# Patient Record
Sex: FEMALE | Race: OTHER | Employment: FULL TIME | URBAN - METROPOLITAN AREA
[De-identification: names, ages, dates, MRNs, and addresses within clinical notes are randomized per-mention and may not be internally consistent; named-entity substitution may affect disease eponyms.]

---

## 2017-03-18 ENCOUNTER — APPOINTMENT (OUTPATIENT)
Dept: GENERAL RADIOLOGY | Age: 52
End: 2017-03-18
Attending: NURSE PRACTITIONER
Payer: COMMERCIAL

## 2017-03-18 ENCOUNTER — HOSPITAL ENCOUNTER (OUTPATIENT)
Age: 52
Discharge: HOME | End: 2017-03-18
Payer: COMMERCIAL

## 2017-03-18 VITALS
HEART RATE: 67 BPM | SYSTOLIC BLOOD PRESSURE: 115 MMHG | DIASTOLIC BLOOD PRESSURE: 62 MMHG | OXYGEN SATURATION: 100 % | BODY MASS INDEX: 21.79 KG/M2 | WEIGHT: 123 LBS | HEIGHT: 63 IN | RESPIRATION RATE: 16 BRPM | TEMPERATURE: 98 F

## 2017-03-18 DIAGNOSIS — J40 BRONCHITIS: ICD-10-CM

## 2017-03-18 DIAGNOSIS — H65.91 RIGHT NON-SUPPURATIVE OTITIS MEDIA: Primary | ICD-10-CM

## 2017-03-18 PROCEDURE — 93010 ELECTROCARDIOGRAM REPORT: CPT | Performed by: NURSE PRACTITIONER

## 2017-03-18 PROCEDURE — 99214 OFFICE O/P EST MOD 30 MIN: CPT

## 2017-03-18 PROCEDURE — 71020 XR CHEST PA + LAT CHEST (CPT=71020): CPT

## 2017-03-18 PROCEDURE — 99204 OFFICE O/P NEW MOD 45 MIN: CPT

## 2017-03-18 PROCEDURE — 93010 ELECTROCARDIOGRAM REPORT: CPT

## 2017-03-18 PROCEDURE — 93005 ELECTROCARDIOGRAM TRACING: CPT

## 2017-03-18 RX ORDER — ALBUTEROL SULFATE 90 UG/1
2 AEROSOL, METERED RESPIRATORY (INHALATION) EVERY 4 HOURS PRN
Qty: 1 INHALER | Refills: 0 | Status: SHIPPED | OUTPATIENT
Start: 2017-03-18 | End: 2017-04-17

## 2017-03-18 RX ORDER — BENZONATATE 100 MG/1
200 CAPSULE ORAL 3 TIMES DAILY PRN
Qty: 30 CAPSULE | Refills: 0 | Status: SHIPPED | OUTPATIENT
Start: 2017-03-18 | End: 2017-04-17

## 2017-03-18 RX ORDER — AZITHROMYCIN 250 MG/1
TABLET, FILM COATED ORAL
Qty: 1 PACKAGE | Refills: 0 | Status: SHIPPED | OUTPATIENT
Start: 2017-03-18 | End: 2017-03-23

## 2017-03-18 NOTE — ED INITIAL ASSESSMENT (HPI)
REPORTS COUGH THAT WORSENS AT NIGHT FOR OVER 1 WEEK. ALSO C/O BILATERAL EAR PAIN AND THROAT PAIN. TAKING MUCINEX AND IBUPROFEN AT HOME. STATES SORE THROAT HAS RESOLVED. STATES SHE HAS FELT \"LIGHTDEADED AND DIZZY. \"

## 2017-03-18 NOTE — ED PROVIDER NOTES
Patient presents with:  Cough/URI  Ear Problem Pain (neurosensory)      HPI:     Lauren Rodriguez is a 45year old female who presents for evaluation and management of a chief complaint of cough without respiratory distress for the past 10 days.   She ha above.  Constitutional and Vital Signs Reviewed.     Physical Exam:     Findings:    /62 mmHg  Pulse 67  Temp(Src) 98.3 °F (36.8 °C) (Temporal)  Resp 16  Ht 160 cm (5' 3\")  Wt 55.792 kg  BMI 21.79 kg/m2  SpO2 100%  GENERAL: well developed, well mary ST elevation. No ectopy. No previous EKG to compare. Xr Chest Pa + Lat Chest (cpt=71020)    3/18/2017  CONCLUSION:  1. Normal examination. No significant change has occurred from November 28, 2005. Patient is aware of the testing results.   Pedro

## 2017-06-28 ENCOUNTER — TELEPHONE (OUTPATIENT)
Dept: INTERNAL MEDICINE CLINIC | Facility: CLINIC | Age: 52
End: 2017-06-28

## 2017-06-28 NOTE — TELEPHONE ENCOUNTER
Per pt, she can not see Dr Sejal Harris soon in a couple of wk, pt would like to know if AMBIKA can give or suggest something pt can do about her neck pain pls send to her pharmacy on file, pt stts that it is triggering her migraine and OTC migraine med is not working

## 2017-06-28 NOTE — TELEPHONE ENCOUNTER
Please advise. Thank you. Pt states that Dr. Carol Constantino and another MD in the office do the EMG testing either on Thursdays or Fridays so she will need to check with her work schedule to make an appt.     Pt states that she has been having her migraine headache

## 2017-06-28 NOTE — TELEPHONE ENCOUNTER
Patient was supposed to have an EMG with the [facility]sciences [place]–Dr. Kiera Jesus. Would suggest sitting up an appointment with Dr. VINES–[phone] for an appointment in addition to doing the EMG with her.   She did have an MRI showing arthritis in the neck whic

## 2017-06-28 NOTE — TELEPHONE ENCOUNTER
Actions Requested:    Patient requesting an appointment   I suggested the ED which will not go to. 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 Patient also is recommending someone else to see besides Dr. Carmen De Jesus.    Would like weakness of the face, arm, or leg on one side of the body, numbness of the face, arm, or leg on one side of the body, loss of speech or garbled speech    Negative Triage Questions:   * Difficult to awaken or acting confused (e.g., disoriented, slurred speec

## 2017-06-29 NOTE — TELEPHONE ENCOUNTER
Okay to give her a letter for missed work and for a few days off if she needs it. Prednisone 5 mg p.o. twice daily for 5 days and then daily for 5 days. tizanidine–2 mg p.o. nightly. –30 days. Meloxicam 15 mg p.o. daily for 30 days.

## 2017-07-01 RX ORDER — MELOXICAM 15 MG/1
15 TABLET ORAL DAILY
Qty: 30 TABLET | Refills: 0 | Status: SHIPPED | OUTPATIENT
Start: 2017-07-01 | End: 2018-02-17

## 2017-07-01 RX ORDER — PREDNISONE 1 MG/1
TABLET ORAL
Qty: 15 TABLET | Refills: 0 | Status: SHIPPED | OUTPATIENT
Start: 2017-07-01 | End: 2018-02-17

## 2017-07-01 RX ORDER — TIZANIDINE HYDROCHLORIDE 2 MG/1
2 CAPSULE, GELATIN COATED ORAL NIGHTLY
Qty: 30 CAPSULE | Refills: 0 | Status: SHIPPED | OUTPATIENT
Start: 2017-07-01 | End: 2018-02-17

## 2017-07-01 NOTE — TELEPHONE ENCOUNTER
Pt returned call, reviewed Dr Karthik Long orders below 6/29/17 with patient who verbalized understanding and agreed with plan.  Message sent to [facility] with letter as per pt request.

## 2018-02-17 ENCOUNTER — OFFICE VISIT (OUTPATIENT)
Dept: INTERNAL MEDICINE CLINIC | Facility: CLINIC | Age: 53
End: 2018-02-17

## 2018-02-17 VITALS
TEMPERATURE: 98 F | WEIGHT: 122 LBS | HEIGHT: 62.5 IN | DIASTOLIC BLOOD PRESSURE: 63 MMHG | HEART RATE: 49 BPM | SYSTOLIC BLOOD PRESSURE: 97 MMHG | BODY MASS INDEX: 21.89 KG/M2

## 2018-02-17 DIAGNOSIS — Z12.31 VISIT FOR SCREENING MAMMOGRAM: ICD-10-CM

## 2018-02-17 DIAGNOSIS — M19.90 ARTHRITIS: Primary | ICD-10-CM

## 2018-02-17 DIAGNOSIS — M81.0 OSTEOPOROSIS, UNSPECIFIED OSTEOPOROSIS TYPE, UNSPECIFIED PATHOLOGICAL FRACTURE PRESENCE: ICD-10-CM

## 2018-02-17 DIAGNOSIS — L20.82 FLEXURAL ECZEMA: ICD-10-CM

## 2018-02-17 DIAGNOSIS — Z00.00 ROUTINE HEALTH MAINTENANCE: ICD-10-CM

## 2018-02-17 PROBLEM — M25.50 ARTHRALGIA: Status: ACTIVE | Noted: 2018-02-17

## 2018-02-17 PROCEDURE — 99212 OFFICE O/P EST SF 10 MIN: CPT | Performed by: INTERNAL MEDICINE

## 2018-02-17 PROCEDURE — 99214 OFFICE O/P EST MOD 30 MIN: CPT | Performed by: INTERNAL MEDICINE

## 2018-02-17 NOTE — PROGRESS NOTES
HPI:    Patient ID: Jose Angel Brennan is a 47year old female. Pt c/o joint pains and itchy patches. She saw Dr. PATEL--no inflammatory arthritis. She has been getting injections. She has a left frozen shoulder which is a little better.   She still has EXCEDRIN MIGRAINE OR as needed Disp:  Rfl:    ADVIL 200 MG OR TABS 1 TABLET EVERY 4 TO 6 HOURS AS NEEDED Disp:  Rfl:        Allergies:  Adhesive Tape           Itching    Comment:blisters  Erythromycin            Palpitations    Comment:Blurred vision  Pcn Counseled regarding need for Calcium with D, 1200 mg in divided doses. Counseled regarding weight-bearing exercise. Counseled regarded consequences of worsening of bone density.            Other Visit Diagnoses     Visit for screening mammogram        Rel

## 2018-02-17 NOTE — ASSESSMENT & PLAN NOTE
Reviewed Dexa scan with pt from 10/2016. Counseled regarding need for Calcium with D, 1200 mg in divided doses. Counseled regarding weight-bearing exercise. Counseled regarded consequences of worsening of bone density.

## 2018-02-17 NOTE — PATIENT INSTRUCTIONS
I recommend that you take calcium carbonate with vitamin D, 600 mg, 1 tab twice a day with food. If the calcium supplements are too large to swallow, chewable calcium supplements are available.  You should also do weight bearing exercise such as walking

## 2018-04-10 ENCOUNTER — OFFICE VISIT (OUTPATIENT)
Dept: INTERNAL MEDICINE CLINIC | Facility: CLINIC | Age: 53
End: 2018-04-10

## 2018-04-10 VITALS
DIASTOLIC BLOOD PRESSURE: 67 MMHG | BODY MASS INDEX: 21.89 KG/M2 | HEART RATE: 59 BPM | HEIGHT: 62.5 IN | WEIGHT: 122 LBS | TEMPERATURE: 98 F | SYSTOLIC BLOOD PRESSURE: 103 MMHG

## 2018-04-10 DIAGNOSIS — M54.12 CERVICAL RADICULOPATHY: Primary | ICD-10-CM

## 2018-04-10 DIAGNOSIS — E55.9 VITAMIN D DEFICIENCY: ICD-10-CM

## 2018-04-10 DIAGNOSIS — M54.41 CHRONIC RIGHT-SIDED LOW BACK PAIN WITH RIGHT-SIDED SCIATICA: ICD-10-CM

## 2018-04-10 DIAGNOSIS — M81.0 AGE-RELATED OSTEOPOROSIS WITHOUT CURRENT PATHOLOGICAL FRACTURE: ICD-10-CM

## 2018-04-10 DIAGNOSIS — Z00.00 ROUTINE PHYSICAL EXAMINATION: ICD-10-CM

## 2018-04-10 DIAGNOSIS — G89.29 CHRONIC RIGHT-SIDED LOW BACK PAIN WITH RIGHT-SIDED SCIATICA: ICD-10-CM

## 2018-04-10 PROBLEM — M54.50 LOW BACK PAIN: Status: ACTIVE | Noted: 2018-04-10

## 2018-04-10 PROCEDURE — 99212 OFFICE O/P EST SF 10 MIN: CPT | Performed by: INTERNAL MEDICINE

## 2018-04-10 PROCEDURE — 99214 OFFICE O/P EST MOD 30 MIN: CPT | Performed by: INTERNAL MEDICINE

## 2018-04-10 RX ORDER — MELOXICAM 15 MG/1
15 TABLET ORAL DAILY
Qty: 30 TABLET | Refills: 2 | Status: SHIPPED | OUTPATIENT
Start: 2018-04-10 | End: 2019-02-13

## 2018-04-10 RX ORDER — MELATONIN 10 MG
CAPSULE ORAL
COMMUNITY
End: 2019-04-13

## 2018-04-10 NOTE — PROGRESS NOTES
HPI:    Patient ID: Dary Alex is a 47year old female. HPI    Review of Systems         Current Outpatient Prescriptions:  Melatonin 10 MG Oral Cap Take by mouth.  Disp:  Rfl:    triamcinolone acetonide 0.1 % External Cream Apply topically 2 (t

## 2018-04-10 NOTE — PATIENT INSTRUCTIONS
Problem List Items Addressed This Visit        Unprioritized    Cervical radiculopathy - Primary     History of chronic cervical radiculopathy but symptoms have improved significantly.   She does sleep on her arm which is probably aggravating her neck stiff

## 2018-04-10 NOTE — PROGRESS NOTES
HPI:    Patient ID: Jose Caba is a 47year old female. Osteoarthritis   This is a chronic problem. The current episode started more than 1 year ago. The problem occurs constantly.  The problem has been waxing and waning (Pain, stiffness, shou Comment:Blurred vision  Pcn [Penicillins]       Hives  Pine Oil  [Pine Tar]    Unknown      04/10/18  1707   BP: 103/67   Pulse: 59   Temp: 98.1 °F (36.7 °C)     Body mass index is 21.96 kg/m².     PHYSICAL EXAM:   Physical Exam   Constitutional: She is yovana pillow for increased support of the neck and shoulder. Call if any other problems. Will not start on a new medication for management of pain here.          Low back pain     Chronic low back pain, radiating into the right groin, right lower extremity manuel Meloxicam 15 MG Oral Tab 30 tablet 2      Sig: Take 1 tablet (15 mg total) by mouth daily.            Imaging & Referrals:  XR HIP + PELVIS MIN 4 VIEWS RIGHT (CPT=73503)  XR LUMBAR SPINE (MIN 4 VIEWS) (CPT=72110)       JQ#[id number]

## 2018-04-10 NOTE — ASSESSMENT & PLAN NOTE
History of chronic cervical radiculopathy but symptoms have improved significantly. She does sleep on her arm which is probably aggravating her neck stiffness and pain. Advised to use a neck roll pillow for increased support of the neck and shoulder.   Ca

## 2018-04-10 NOTE — ASSESSMENT & PLAN NOTE
Chronic low back pain, radiating into the right groin, right lower extremity almost to the right ankle. No specific injuries at this time but patient has had multiple injuries when younger and has been under chiropractic care.   X-rays of the lumbar spine,

## 2019-01-09 ENCOUNTER — WALK IN (OUTPATIENT)
Dept: URGENT CARE | Age: 54
End: 2019-01-09

## 2019-01-09 VITALS
BODY MASS INDEX: 20.73 KG/M2 | DIASTOLIC BLOOD PRESSURE: 74 MMHG | HEART RATE: 68 BPM | TEMPERATURE: 98 F | RESPIRATION RATE: 14 BRPM | HEIGHT: 63 IN | WEIGHT: 117 LBS | SYSTOLIC BLOOD PRESSURE: 118 MMHG

## 2019-01-09 DIAGNOSIS — B37.2 CANDIDAL INTERTRIGO: Primary | ICD-10-CM

## 2019-01-09 PROCEDURE — 99203 OFFICE O/P NEW LOW 30 MIN: CPT | Performed by: NURSE PRACTITIONER

## 2019-01-09 RX ORDER — KETOCONAZOLE 20 MG/G
CREAM TOPICAL DAILY
Qty: 15 G | Refills: 0
Start: 2019-01-09 | End: 2019-01-16

## 2019-01-09 ASSESSMENT — ENCOUNTER SYMPTOMS
ANOREXIA: 0
NAIL CHANGES: 0
EYES NEGATIVE: 1
FATIGUE: 0
FEVER: 0
RHINORRHEA: 0
WOUND: 0
EYE PAIN: 0
SHORTNESS OF BREATH: 0
VOMITING: 0
SORE THROAT: 0
GASTROINTESTINAL NEGATIVE: 1
COUGH: 0
RESPIRATORY NEGATIVE: 1
DIARRHEA: 0
CONSTITUTIONAL NEGATIVE: 1

## 2019-02-13 ENCOUNTER — TELEPHONE (OUTPATIENT)
Dept: INTERNAL MEDICINE CLINIC | Facility: CLINIC | Age: 54
End: 2019-02-13

## 2019-02-13 RX ORDER — MELOXICAM 15 MG/1
15 TABLET ORAL DAILY
Qty: 30 TABLET | Refills: 2 | Status: SHIPPED | OUTPATIENT
Start: 2019-02-13 | End: 2020-02-11

## 2019-02-13 NOTE — TELEPHONE ENCOUNTER
Pt would like to schedule an appt for a physical and pap with Dr. Isma Pollard. Pt would like to be seen at 6:00 at any day and location. Pt states that she needs this for her employment. There are no available appointments.      Please reply to pool: EM CALL LAKEISHA

## 2019-02-14 NOTE — TELEPHONE ENCOUNTER
Pt returning call back but per pt she can not come in next wk Thursday due to her job. Pt stts that she can come in on 02/28/2019 or 03/01/2019 @ 5:30PM.  Pls advise Thank you!!!     Please reply to pool: ANTONETTE García

## 2019-02-14 NOTE — TELEPHONE ENCOUNTER
Please review; protocol failed.   Refill Protocol Appointment Criteria  · Appointment scheduled in the past 6 months or in the next 3 months  Recent Outpatient Visits            10 months ago Cervical radiculopathy    [facility], [facility], [address] [facility]

## 2019-02-17 ENCOUNTER — APPOINTMENT (OUTPATIENT)
Dept: GENERAL RADIOLOGY | Age: 54
End: 2019-02-17
Attending: NURSE PRACTITIONER
Payer: COMMERCIAL

## 2019-02-17 ENCOUNTER — HOSPITAL ENCOUNTER (OUTPATIENT)
Age: 54
Discharge: HOME | End: 2019-02-17
Payer: COMMERCIAL

## 2019-02-17 VITALS
OXYGEN SATURATION: 97 % | BODY MASS INDEX: 21.9 KG/M2 | SYSTOLIC BLOOD PRESSURE: 105 MMHG | TEMPERATURE: 98 F | RESPIRATION RATE: 18 BRPM | HEIGHT: 62 IN | HEART RATE: 68 BPM | DIASTOLIC BLOOD PRESSURE: 60 MMHG | WEIGHT: 119 LBS

## 2019-02-17 DIAGNOSIS — S62.102A CLOSED FRACTURE OF LEFT WRIST, INITIAL ENCOUNTER: ICD-10-CM
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
## 2019-02-17 PROCEDURE — 73080 X-RAY EXAM OF ELBOW: CPT | Performed by: NURSE PRACTITIONER

## 2019-02-17 PROCEDURE — 99214 OFFICE O/P EST MOD 30 MIN: CPT

## 2019-02-17 PROCEDURE — 73110 X-RAY EXAM OF WRIST: CPT | Performed by: NURSE PRACTITIONER

## 2019-02-17 NOTE — ED INITIAL ASSESSMENT (HPI)
PATIENT ARRIVED AMBULATORY TO ROOM. PATIENT FELL PTA PLAYING IN THE BACK YARD WITH HER DOGS. PATIENT C/O LEFT ELBOW PAIN AND LEFT WRIST PAIN. NO OBVIOUS DEFORMITY. PAIN WORSENS WITH MOVEMENT. PATIENT DENIES HITTING HER HEAD.

## 2019-02-17 NOTE — ED PROVIDER NOTES
Patient presents with:  Fall      HPI:     Lencho Amin is a 46year old female who presents today with a chief complaint of pain in the left wrist and elbow after a fall that occurred PTA.   The patient states she slipped on some ice in her backyard use: Yes        Alcohol/week: 0.0 oz        Comment: 1-2 drinks/week      Drug use: No      Sexual activity: Not on file    Lifestyle      Physical activity:        Days per week: Not on file        Minutes per session: Not on file      Stress: Not on file hand without difficulty.   Point Tenderness: Yes    /60   Pulse 68   Temp 98.3 °F (36.8 °C) (Oral)   Resp 18   Ht 157.5 cm (5' 2\")   Wt 54 kg   SpO2 97%   BMI 21.77 kg/m²   GENERAL: well developed, well nourished, well hydrated, no distress  SKIN: go Soft tissue swelling of the left elbow without radiographic evidence of underlying osseous injury.     Dictated by (CST): Sherman Lyons MD on 2/17/2019 at 12:11     Approved by (CST): Sherman Lyons MD on 2/17/2019 at 12:12          Xr Wrist Complete (mi

## 2019-02-20 ENCOUNTER — OFFICE VISIT (OUTPATIENT)
Dept: ORTHOPEDICS CLINIC | Facility: CLINIC | Age: 54
End: 2019-02-20
Payer: COMMERCIAL

## 2019-02-20 DIAGNOSIS — S52.572A OTHER CLOSED INTRA-ARTICULAR FRACTURE OF DISTAL END OF LEFT RADIUS, INITIAL ENCOUNTER: Primary | ICD-10-CM

## 2019-02-20 PROCEDURE — L3908 WHO COCK-UP NONMOLDE PRE OTS: HCPCS | Performed by: ORTHOPAEDIC SURGERY

## 2019-02-20 PROCEDURE — 99243 OFF/OP CNSLTJ NEW/EST LOW 30: CPT | Performed by: ORTHOPAEDIC SURGERY

## 2019-02-20 PROCEDURE — 99212 OFFICE O/P EST SF 10 MIN: CPT | Performed by: ORTHOPAEDIC SURGERY

## 2019-02-20 NOTE — H&P
NURSING INTAKE COMMENTS: Patient presents with:  Consult: left wrist- pt states she fell on ice while playing with her dogs on 2/17/19. pt went to [facility] and had [facility]R. HPI: This 46year old female presents today with complaints of left wrist pain.   Patient Years of education: Not on file      Highest education level: Not on file    Occupational History      Not on file    Social Needs      Financial resource strain: Not on file      Food insecurity:        Worry: Not on file        Inability: Not on file systems was completed. Pertinent positives and negatives noted in the the HPI.     Physical Examination:  There is no height or weight on file to calculate BMI., Wt Readings from Last 6 Encounters:  02/17/19 : 119 lb (54 kg)  04/10/18 : 122 lb (55.3 kg)  0

## 2019-02-27 ENCOUNTER — OFFICE VISIT (OUTPATIENT)
Dept: ORTHOPEDICS CLINIC | Facility: CLINIC | Age: 54
End: 2019-02-27
Payer: COMMERCIAL

## 2019-02-27 ENCOUNTER — HOSPITAL ENCOUNTER (OUTPATIENT)
Dept: GENERAL RADIOLOGY | Facility: HOSPITAL | Age: 54
Discharge: HOME | End: 2019-02-27
Attending: ORTHOPAEDIC SURGERY
Payer: COMMERCIAL

## 2019-02-27 DIAGNOSIS — S52.572A OTHER CLOSED INTRA-ARTICULAR FRACTURE OF DISTAL END OF LEFT RADIUS, INITIAL ENCOUNTER: ICD-10-CM

## 2019-02-27 DIAGNOSIS — Z47.89 ORTHOPEDIC AFTERCARE: ICD-10-CM

## 2019-02-27 DIAGNOSIS — Z47.89 ORTHOPEDIC AFTERCARE: Primary | ICD-10-CM

## 2019-02-27 PROCEDURE — 99212 OFFICE O/P EST SF 10 MIN: CPT | Performed by: ORTHOPAEDIC SURGERY

## 2019-02-27 PROCEDURE — 73110 X-RAY EXAM OF WRIST: CPT | Performed by: ORTHOPAEDIC SURGERY

## 2019-02-27 PROCEDURE — 99213 OFFICE O/P EST LOW 20 MIN: CPT | Performed by: ORTHOPAEDIC SURGERY

## 2019-02-27 NOTE — PROGRESS NOTES
NURSING INTAKE COMMENTS: Patient presents with:  Fracture: clare wrist -- Rates pain 0/10. States splint can cause some discomfort. Typing causing discomfort down forearm. States she has had some tingling in thum and 2nd finger one day. Right handed. Marital status:       Spouse name: Not on file      Number of children: Not on file      Years of education: Not on file      Highest education level: Not on file    Occupational History      Not on file    Social Needs      Financial resource stra Self-Exams: Not Asked    Social History Narrative      Not on file      A comprehensive 10 point review of systems was completed. Pertinent positives and negatives noted in the the HPI.     Physical Examination:  There is no height or weight on file to leila

## 2019-04-13 ENCOUNTER — OFFICE VISIT (OUTPATIENT)
Dept: INTERNAL MEDICINE CLINIC | Facility: CLINIC | Age: 54
End: 2019-04-13
Payer: COMMERCIAL

## 2019-04-13 VITALS
BODY MASS INDEX: 22.25 KG/M2 | SYSTOLIC BLOOD PRESSURE: 130 MMHG | DIASTOLIC BLOOD PRESSURE: 70 MMHG | WEIGHT: 124 LBS | HEART RATE: 73 BPM | HEIGHT: 62.5 IN

## 2019-04-13 DIAGNOSIS — Z00.00 PHYSICAL EXAM: Primary | ICD-10-CM

## 2019-04-13 DIAGNOSIS — Z12.31 VISIT FOR SCREENING MAMMOGRAM: ICD-10-CM

## 2019-04-13 DIAGNOSIS — M81.0 AGE-RELATED OSTEOPOROSIS WITHOUT CURRENT PATHOLOGICAL FRACTURE: ICD-10-CM

## 2019-04-13 DIAGNOSIS — Z12.11 ENCOUNTER FOR SCREENING COLONOSCOPY: ICD-10-CM

## 2019-04-13 DIAGNOSIS — I05.9 MITRAL VALVE DISORDER: ICD-10-CM

## 2019-04-13 PROCEDURE — 99396 PREV VISIT EST AGE 40-64: CPT | Performed by: INTERNAL MEDICINE

## 2019-04-13 RX ORDER — CLINDAMYCIN HYDROCHLORIDE 300 MG/1
CAPSULE ORAL 3 TIMES DAILY
COMMUNITY
End: 2019-05-02 | Stop reason: ALTCHOICE

## 2019-04-13 NOTE — PROGRESS NOTES
Josh Lagos is a 46year old female.   Patient presents with:  Physical      HPI:   Pt comes for physical  C/c physical   C/o has inf of the gum and needs a release for dental extraction bc she is on meloxicam   Will go for the extraction baldemar week ear pain just ringing in the right ear ,  nasal congestion or sore throat  SKIN: denies any unusual skin lesions , +  Rashes, bellybutton midline buttocks and left arm-- was on steroid then antifungal -does use non-scented for hypoallergenic sensitive skin screening colonoscopy  -     GASTRO - INTERNAL  Will refer to GI  Mitral valve disorder  -     CARD ECHO 2D DOPPLER (CPT=93306);  Future    Age-related osteoporosis without current pathological fracture   Noted osteoporosis in her previous DEXA scan, can co

## 2019-04-13 NOTE — PATIENT INSTRUCTIONS
Prevention Guidelines, Women Ages 48 to 59  Screening tests and vaccines are an important part of managing your health. A screening test is done to find possible disorders or diseases in people who don't have any symptoms.  The goal is to find a disease e Chlamydia Women at increased risk for infection At routine exams   Colorectal cancer All women in this age group Flexible sigmoidoscopy every 5 years, or colonoscopy every 10 years, or double-contrast barium enema every 5 years; yearly fecal occult blood t Hepatitis B Women at increased risk for infection – talk with your healthcare provider 3 doses over 6 months; second dose should be given 1 month after the first dose; the third dose should be given at least 2 months after the second dose and at least 4 mo Use of daily aspirin Women ages 54 and up in this age group who are at risk for cardiovascular health problems such as stroke When your risk is known   Use of tobacco and the health effects it can cause All women in this age group Every exam   1Amerjp Ca

## 2019-05-02 ENCOUNTER — OFFICE VISIT (OUTPATIENT)
Dept: INTERNAL MEDICINE CLINIC | Facility: CLINIC | Age: 54
End: 2019-05-02
Payer: COMMERCIAL

## 2019-05-02 ENCOUNTER — TELEPHONE (OUTPATIENT)
Dept: INTERNAL MEDICINE CLINIC | Facility: CLINIC | Age: 54
End: 2019-05-02

## 2019-05-02 VITALS
HEART RATE: 51 BPM | WEIGHT: 123 LBS | DIASTOLIC BLOOD PRESSURE: 69 MMHG | TEMPERATURE: 98 F | BODY MASS INDEX: 22.07 KG/M2 | HEIGHT: 62.5 IN | SYSTOLIC BLOOD PRESSURE: 106 MMHG

## 2019-05-02 DIAGNOSIS — K20.80 PILL ESOPHAGITIS: Primary | ICD-10-CM

## 2019-05-02 DIAGNOSIS — T50.905A PILL ESOPHAGITIS: Primary | ICD-10-CM

## 2019-05-02 PROCEDURE — 99213 OFFICE O/P EST LOW 20 MIN: CPT | Performed by: INTERNAL MEDICINE

## 2019-05-02 PROCEDURE — 99212 OFFICE O/P EST SF 10 MIN: CPT | Performed by: INTERNAL MEDICINE

## 2019-05-02 NOTE — TELEPHONE ENCOUNTER
Pt called in stating that her preferred lab is through [facility]. She is asking for 4/13 labs to be faxed over there today, because she has appt set up. Please advise and please call back with confirmation.      Lauren Snowden on [facility] [place]  Fax# [phone]

## 2019-05-02 NOTE — PROGRESS NOTES
Patient ID: Harshad Claire is a 46year old female. Patient presents with: Other: pt concerned might have burn esophagus. Took 3 advils without water on Saturday morning, and now has pressure on chest painful to swallow.  Burning sensation when layi PALPITATIONS    Comment:Blurred vision  Pcn [Penicillins]       HIVES  Pine Oil  [Pine Tar]    UNKNOWN    Social History    Socioeconomic History      Marital status:       Spouse name: Not on file      Number of children: Not on file      Years Special Diet: Not Asked        Back Care: Not Asked        Exercise: Not Asked        Bike Helmet: Not Asked        Seat Belt: Not Asked        Self-Exams: Not Asked    Social History Narrative      Not on file          PHYSICAL EXAM:      05/02/19  1007

## 2019-05-02 NOTE — PATIENT INSTRUCTIONS
Esophagitis     With esophagitis, the lining of the esophagus is inflamed. Do you often have burning pain in your chest? You may have esophagitis.  This is when the lining of the esophagus becomes red and swollen (inflamed). The esophagus is the tube th · Coughing, wheezing, or asthma  · Hoarseness  Diagnosis of esophagitis  Your healthcare provider will ask about your health history and symptoms. You’ll also be examined. Sometimes certain tests are needed.  These may include:  · Upper endoscopy. A thin, f · Raise your upper body by 4 to 6 inches when lying in bed. This can be done using a foam wedge. Or put blocks under the legs at the head of your bed. Surgery.  This may be needed for severe reflux esophagitis. Other noninvasive procedures to treat GERD an

## 2019-09-16 ENCOUNTER — HOSPITAL ENCOUNTER (OUTPATIENT)
Dept: GENERAL RADIOLOGY | Age: 54
Discharge: HOME | End: 2019-09-16
Attending: INTERNAL MEDICINE
Payer: COMMERCIAL

## 2019-09-16 ENCOUNTER — OFFICE VISIT (OUTPATIENT)
Dept: INTERNAL MEDICINE CLINIC | Facility: CLINIC | Age: 54
End: 2019-09-16
Payer: COMMERCIAL

## 2019-09-16 VITALS
DIASTOLIC BLOOD PRESSURE: 59 MMHG | TEMPERATURE: 98 F | WEIGHT: 127 LBS | HEART RATE: 47 BPM | SYSTOLIC BLOOD PRESSURE: 113 MMHG | HEIGHT: 62.5 IN | BODY MASS INDEX: 22.79 KG/M2

## 2019-09-16 DIAGNOSIS — M54.12 CERVICAL RADICULOPATHY: ICD-10-CM

## 2019-09-16 DIAGNOSIS — R51.9 HEADACHE DISORDER: ICD-10-CM

## 2019-09-16 DIAGNOSIS — M54.12 CERVICAL RADICULOPATHY: Primary | ICD-10-CM

## 2019-09-16 PROCEDURE — 72050 X-RAY EXAM NECK SPINE 4/5VWS: CPT | Performed by: INTERNAL MEDICINE

## 2019-09-16 PROCEDURE — 99214 OFFICE O/P EST MOD 30 MIN: CPT | Performed by: INTERNAL MEDICINE

## 2019-09-16 RX ORDER — METHOCARBAMOL 500 MG/1
TABLET, FILM COATED ORAL 3 TIMES DAILY PRN
Qty: 30 TABLET | Refills: 1 | Status: SHIPPED | OUTPATIENT
Start: 2019-09-16 | End: 2020-02-11

## 2019-09-16 RX ORDER — TRAMADOL HYDROCHLORIDE 50 MG/1
50 TABLET ORAL EVERY 6 HOURS PRN
Qty: 30 TABLET | Refills: 1 | Status: SHIPPED | OUTPATIENT
Start: 2019-09-16 | End: 2020-04-27

## 2019-09-16 NOTE — PROGRESS NOTES
Patient ID: Dino Del Rio is a 46year old female. Patient presents with:  Headache: persistant headache for 3 days, excedrin not helping,  note for missing work        HISTORY OF PRESENT ILLNESS:   HPI  Patient presents for above.   Here with neck mg total) by mouth daily. , Disp: 30 tablet, Rfl: 2  •  Omega-3 Fatty Acids (FISH OIL) 1200 MG Oral Cap, Take by mouth., Disp: , Rfl:   •  Vitamin C 500 MG Oral Tab, Take 500 mg by mouth daily. , Disp: , Rfl:   •  EXCEDRIN MIGRAINE OR, as needed, Disp: , Rfl current or ex partner: Not on file        Emotionally abused: Not on file        Physically abused: Not on file        Forced sexual activity: Not on file    Other Topics      Concerns:         Service: Not Asked        Blood Transfusions: Not Aske Tab; Take 1-2 tablets (500-1,000 mg total) by mouth 3 (three) times daily as needed. Dispense: 30 tablet; Refill: 1  · traMADol HCl 50 MG Oral Tab; Take 1 tablet (50 mg total) by mouth every 6 (six) hours as needed for Pain. Dispense: 30 tablet;  Refill:

## 2019-09-16 NOTE — PATIENT INSTRUCTIONS
Neck Exercises: Active Neck Rotation    To start, lie on your back, knees bent and feet flat on the floor. Keep your ears, shoulders, and hips aligned, but don’t press your lower back to the floor. Rest your hands on your pelvis.  Breathe deeply and rel shoulders. Keep your spine in a neutral position (not arched or sagging). Keep your ears in line with your shoulders.  Hold for a few seconds before starting the exercise:  · Keeping your back straight, slowly drop your chin toward your chest. Tuck in your stretch in the muscles in the back of your neck. Don’t force the motion. · Hold for 20 seconds, then return to starting position. Switch arms. · Repeat 5 to 10 times. Date Last Reviewed: 3/1/2018  © 2319-4508 [employer].  [address] [place] [address] Repeat 5 times. For your safety, check with your healthcare provider before starting an exercise program.  Date Last Reviewed: 11/1/2017  © 4591-8096 [employer]. [address] [facility], [address] [place] [address]. All rights reserved.  This informati

## 2020-02-11 ENCOUNTER — OFFICE VISIT (OUTPATIENT)
Dept: INTERNAL MEDICINE CLINIC | Facility: CLINIC | Age: 55
End: 2020-02-11
Payer: COMMERCIAL

## 2020-02-11 VITALS
HEIGHT: 62 IN | SYSTOLIC BLOOD PRESSURE: 110 MMHG | TEMPERATURE: 98 F | BODY MASS INDEX: 24.78 KG/M2 | WEIGHT: 134.63 LBS | HEART RATE: 61 BPM | DIASTOLIC BLOOD PRESSURE: 55 MMHG

## 2020-02-11 DIAGNOSIS — H93.8X3 EAR CONGESTION, BILATERAL: Primary | ICD-10-CM

## 2020-02-11 PROCEDURE — 99213 OFFICE O/P EST LOW 20 MIN: CPT | Performed by: PHYSICIAN ASSISTANT

## 2020-02-11 NOTE — PROGRESS NOTES
HPI:    Patient ID: Dinorah Carrington is a 53year old female. HPI   Patient presents for right ear pain, swollen gland and body aches. Has had pain for 2 days. No fevers. Review of Systems   Constitutional: Negative.     HENT: Positive for wilmer Maternal Grandmother 72   • Stroke Maternal Grandmother         Cerebral Vascular Accident    • Heart Disorder Father          PHYSICAL EXAM:   /55 (BP Location: Right arm, Patient Position: Sitting, Cuff Size: large)   Pulse 61   Temp 98.4 °F (36.9

## 2020-03-30 ENCOUNTER — PATIENT MESSAGE (OUTPATIENT)
Dept: INTERNAL MEDICINE CLINIC | Facility: CLINIC | Age: 55
End: 2020-03-30

## 2020-03-31 ENCOUNTER — TELEPHONE (OUTPATIENT)
Dept: INTERNAL MEDICINE CLINIC | Facility: CLINIC | Age: 55
End: 2020-03-31

## 2020-03-31 DIAGNOSIS — Z20.822 EXPOSURE TO 2019 NOVEL CORONAVIRUS: Primary | ICD-10-CM

## 2020-03-31 NOTE — TELEPHONE ENCOUNTER
Left message to pt to call back H#. Also routed to Dr. Medrano Dears for advise, thanks. No future appointments.

## 2020-03-31 NOTE — TELEPHONE ENCOUNTER
Triage RN=please call patient.      ----- Message from [address] sent at 3/30/2020  1:11 PM CDT -----  Regarding: Prescription Question  Contact: [phone]  Donnie Afternoon,  Re; covid-19 testing.   I have been in a work environment that has

## 2020-03-31 NOTE — TELEPHONE ENCOUNTER
See telephone encounter 3/31/20. From: Bonnie Dexter  To: Marlene Baez MD  Sent: 3/30/2020  1:11 PM CDT  Subject: Prescription Question    Good Afternoon,  Re; covid-19 testing.   I have been in a work environment that has had a positive testi

## 2020-04-01 ENCOUNTER — PATIENT MESSAGE (OUTPATIENT)
Dept: OTHER | Age: 55
End: 2020-04-01

## 2020-04-01 NOTE — TELEPHONE ENCOUNTER
Called and left patient detailed message regarding third-party possible exposure to COVID-19 from her work environment as well as her partners office with someone with COVID-19  Did acknowledge patient had medical conditions but most likely without talking

## 2020-04-02 PROCEDURE — 99213 OFFICE O/P EST LOW 20 MIN: CPT | Performed by: INTERNAL MEDICINE

## 2020-04-02 NOTE — TELEPHONE ENCOUNTER
Pt called back and stated her chest feels like on fire, no fever, shills and body ache. I informed pt to check her temperature and pt stated she does not have a thermometer. Pt stated she is working from home.

## 2020-04-02 NOTE — TELEPHONE ENCOUNTER
Telemedicine Visit for Respiratory Illness - Potential COVID-19 Infection    Virtual/Telephone Check-In     Nidia Silver verbally consents to a Virtual/Telephone Check-In service on 04/02/20.  Patient understands and accepts financial responsibility medical/social history:   • Hypertension: Yes []     No [x]     • Asthma/COPD/other respiratory: Yes []     No [x]     • Diabetes: Yes []     No [x]      • Heart disease: Yes [x]     No []     Has h/o itral valve prolapse   • Travel: Yes []     No [x] other symptoms and to call back if she is not better  Advised frequent handwashing's and to continue with her social distancing/isolation, shelter in place etc.    Duration of service, in minutes: 12 min   Follow up: Call back if any symptoms    Braeden Bordenbrandon

## 2020-04-03 NOTE — TELEPHONE ENCOUNTER
From: Jennifer Devi  To: Trish Aguilar  Sent: 4/1/2020 3:24 PM CDT  Subject: Medication    Alek Fuentes,    We have received your request to remove Saccharomyces boulardii (FLORASTOR OR) from your medication list. We appreciate you taking an active role

## 2020-04-25 ENCOUNTER — NURSE TRIAGE (OUTPATIENT)
Dept: INTERNAL MEDICINE CLINIC | Facility: CLINIC | Age: 55
End: 2020-04-25

## 2020-04-25 NOTE — TELEPHONE ENCOUNTER
Patient reports did send mychart msg this morning symptoms discussed today are the same symptoms she was referring to in msg, reports main concern is her legs swelling and having knee pain, has had about 2 weeks now, previously was having lower lumbar pain

## 2020-04-25 NOTE — TELEPHONE ENCOUNTER
This message is vastly different from the my chart message sent last night-is this a new problem starting this am-if not please set up a video visit for monday

## 2020-04-25 NOTE — TELEPHONE ENCOUNTER
Action Requested: Summary for Provider     []  Critical Lab, Recommendations Needed  [] Need Additional Advice  []   FYI    []   Need Orders  [] Need Medications Sent to Pharmacy  []  Other     SUMMARY: Dr Charles Amin, please advise. Patient at [phone].

## 2020-04-27 ENCOUNTER — LAB ENCOUNTER (OUTPATIENT)
Dept: LAB | Facility: HOSPITAL | Age: 55
End: 2020-04-27
Attending: INTERNAL MEDICINE
Payer: COMMERCIAL

## 2020-04-27 ENCOUNTER — TELEPHONE (OUTPATIENT)
Dept: INTERNAL MEDICINE CLINIC | Facility: CLINIC | Age: 55
End: 2020-04-27

## 2020-04-27 ENCOUNTER — TELEMEDICINE (OUTPATIENT)
Dept: INTERNAL MEDICINE CLINIC | Facility: CLINIC | Age: 55
End: 2020-04-27

## 2020-04-27 VITALS — HEART RATE: 58 BPM | WEIGHT: 132 LBS | BODY MASS INDEX: 24 KG/M2

## 2020-04-27 DIAGNOSIS — Z20.822 CLOSE EXPOSURE TO COVID-19 VIRUS: ICD-10-CM

## 2020-04-27 DIAGNOSIS — R06.00 DOE (DYSPNEA ON EXERTION): ICD-10-CM

## 2020-04-27 DIAGNOSIS — I05.9 MITRAL VALVE DISORDER: ICD-10-CM

## 2020-04-27 DIAGNOSIS — M19.90 ARTHRITIS: Primary | ICD-10-CM

## 2020-04-27 PROBLEM — R06.09 DOE (DYSPNEA ON EXERTION): Status: ACTIVE | Noted: 2020-04-27

## 2020-04-27 PROCEDURE — 99214 OFFICE O/P EST MOD 30 MIN: CPT | Performed by: INTERNAL MEDICINE

## 2020-04-27 NOTE — ASSESSMENT & PLAN NOTE
Gradually worsening dyspnea on exertion with bilateral lower extremity swelling–seems worse after waking up in the morning. Some joint pains associated. Unable to complete a full cardiac evaluation. Orders provided in the past have not been completed.

## 2020-04-27 NOTE — ASSESSMENT & PLAN NOTE
Joint pain which has been chronic but much worsened at this time. Last work-up a while back. Recheck has been ordered. Advised to avoid Advil and Aleve which she has been taking on a regular basis.   Changed to Tylenol 500-650 mg 2-3 times a day as neede

## 2020-04-27 NOTE — PROGRESS NOTES
HPI:    Patient ID: Ok Mckeon is a 53year old female. Last labs in May 2019-normal.    G4,p3,1 [id number]  Pap Smear,3 Years due on 10/06/2019  Colonoscopy due on 10/17/2014 -REFUSES TO GET THIS DONE.       Osteoarthritis   This is a chronic Negative for congestion and sore throat. Eyes: Negative. Respiratory: Positive for cough and shortness of breath. Cardiovascular: Positive for dyspnea on exertion, palpitations and leg swelling. Negative for chest pain.    Gastrointestinal: Negativ has a normal mood and affect. Her behavior is normal. Judgment and thought content normal.   Vitals reviewed.              ASSESSMENT/PLAN:     Problem List Items Addressed This Visit        Unprioritized    Mitral valve disorder     Gradually worsening pal CYCLIC CITRULLINATE PEP. IGG    Close exposure to COVID-19 virus     History of exposure to COVID-19 mid-March, developed burning sensation chest with chills, low-grade fevers generalized malaise and a mild cough.   Continues to have mild postnasal drip [E]      Meds This Visit:  Requested Prescriptions      No prescriptions requested or ordered in this encounter       Imaging & Referrals:  None       #[id number]

## 2020-04-27 NOTE — ASSESSMENT & PLAN NOTE
Gradually worsening palpitations, irregular heart rhythm, shortness of breath with exertion and leg swelling. Joint pains associated. Recent history of COVID like infection versus viral infection.   Will need complete cardiac evaluation to rule out mitral

## 2020-04-27 NOTE — TELEPHONE ENCOUNTER
Goran Nye pt wanted me to send you this message. She stated that she did call [facility] for her covid 23 order test and was informed that they will call her today or tomorrow and inform her if her order was approved. If not she will call you back.  She just

## 2020-04-27 NOTE — ASSESSMENT & PLAN NOTE
History of exposure to COVID-19 mid-March, developed burning sensation chest with chills, low-grade fevers generalized malaise and a mild cough. Continues to have mild postnasal drip and a cough, chills have resolved but fatigue persists.   Has noticed sig

## 2020-04-27 NOTE — PATIENT INSTRUCTIONS
Problem List Items Addressed This Visit        Unprioritized    Arthritis - Primary     Joint pain which has been chronic but much worsened at this time. Last work-up a while back. Recheck has been ordered.   Advised to avoid Advil and Aleve which she has exertion with bilateral lower extremity swelling–seems worse after waking up in the morning. Some joint pains associated. Unable to complete a full cardiac evaluation. Orders provided in the past have not been completed.   Patient is advised to consider

## 2020-04-28 ENCOUNTER — TELEPHONE (OUTPATIENT)
Dept: INTERNAL MEDICINE CLINIC | Facility: CLINIC | Age: 55
End: 2020-04-28

## 2020-04-28 NOTE — TELEPHONE ENCOUNTER
Patient calling reports she was tested for COVID at [facility] yesterday     Was called today and informed that her test was \" false - positive, inconclusive \"  And was instructed to call Dr. Ronn Lainez  To see if she needs to be re-tested     Patient does

## 2020-04-29 ENCOUNTER — LAB ENCOUNTER (OUTPATIENT)
Dept: LAB | Facility: HOSPITAL | Age: 55
End: 2020-04-29
Attending: INTERNAL MEDICINE
Payer: COMMERCIAL

## 2020-04-29 DIAGNOSIS — Z20.822 CLOSE EXPOSURE TO COVID-19 VIRUS: ICD-10-CM

## 2020-04-30 ENCOUNTER — PATIENT MESSAGE (OUTPATIENT)
Dept: INTERNAL MEDICINE CLINIC | Facility: CLINIC | Age: 55
End: 2020-04-30

## 2020-04-30 NOTE — TELEPHONE ENCOUNTER
From: Cam Dexter  To:  Luis A Roa MD  Sent: 4/30/2020 12:04 PM CDT  Subject: Prescription Question    Can you please send order for all lab work requested from our E- Visit to the [facility] location provided in attachment  I have received gulshan

## 2020-05-09 ENCOUNTER — PATIENT MESSAGE (OUTPATIENT)
Dept: RHEUMATOLOGY | Facility: CLINIC | Age: 55
End: 2020-05-09

## 2020-05-13 ENCOUNTER — TELEPHONE (OUTPATIENT)
Dept: RHEUMATOLOGY | Facility: CLINIC | Age: 55
End: 2020-05-13

## 2020-05-13 ENCOUNTER — PATIENT MESSAGE (OUTPATIENT)
Dept: RHEUMATOLOGY | Facility: CLINIC | Age: 55
End: 2020-05-13

## 2020-05-13 NOTE — TELEPHONE ENCOUNTER
I left a voicemail message in regards to your schedule appointment for tomorrow at 11 am. Your last office visit was on 5/26/16  Dr. Asha Hernandez is requesting to start your visit a little earlier at 10:40 am to provide adequate time to discuss your health

## 2020-05-14 ENCOUNTER — TELEMEDICINE (OUTPATIENT)
Dept: RHEUMATOLOGY | Facility: CLINIC | Age: 55
End: 2020-05-14

## 2020-05-14 ENCOUNTER — TELEPHONE (OUTPATIENT)
Dept: RHEUMATOLOGY | Facility: CLINIC | Age: 55
End: 2020-05-14

## 2020-05-14 ENCOUNTER — LAB ENCOUNTER (OUTPATIENT)
Dept: LAB | Age: 55
End: 2020-05-14
Attending: INTERNAL MEDICINE
Payer: COMMERCIAL

## 2020-05-14 DIAGNOSIS — R76.8 POSITIVE ANA (ANTINUCLEAR ANTIBODY): Primary | ICD-10-CM

## 2020-05-14 DIAGNOSIS — M79.10 MYALGIA: ICD-10-CM

## 2020-05-14 DIAGNOSIS — M25.50 POLYARTHRALGIA: ICD-10-CM

## 2020-05-14 DIAGNOSIS — R76.8 POSITIVE ANA (ANTINUCLEAR ANTIBODY): ICD-10-CM

## 2020-05-14 DIAGNOSIS — M79.662 PAIN OF LEFT CALF: ICD-10-CM

## 2020-05-14 PROCEDURE — 82085 ASSAY OF ALDOLASE: CPT

## 2020-05-14 PROCEDURE — 86160 COMPLEMENT ANTIGEN: CPT

## 2020-05-14 PROCEDURE — 85610 PROTHROMBIN TIME: CPT

## 2020-05-14 PROCEDURE — 85730 THROMBOPLASTIN TIME PARTIAL: CPT

## 2020-05-14 PROCEDURE — 86235 NUCLEAR ANTIGEN ANTIBODY: CPT

## 2020-05-14 PROCEDURE — 85613 RUSSELL VIPER VENOM DILUTED: CPT

## 2020-05-14 PROCEDURE — 82550 ASSAY OF CK (CPK): CPT

## 2020-05-14 PROCEDURE — 86140 C-REACTIVE PROTEIN: CPT

## 2020-05-14 PROCEDURE — 99204 OFFICE O/P NEW MOD 45 MIN: CPT | Performed by: INTERNAL MEDICINE

## 2020-05-14 PROCEDURE — 85390 FIBRINOLYSINS SCREEN I&R: CPT

## 2020-05-14 PROCEDURE — 85652 RBC SED RATE AUTOMATED: CPT

## 2020-05-14 PROCEDURE — 85598 HEXAGNAL PHOSPH PLTLT NEUTRL: CPT

## 2020-05-14 PROCEDURE — 86225 DNA ANTIBODY NATIVE: CPT

## 2020-05-14 PROCEDURE — 86147 CARDIOLIPIN ANTIBODY EA IG: CPT

## 2020-05-14 PROCEDURE — 86146 BETA-2 GLYCOPROTEIN ANTIBODY: CPT

## 2020-05-14 PROCEDURE — 36415 COLL VENOUS BLD VENIPUNCTURE: CPT

## 2020-05-14 RX ORDER — PREDNISONE 1 MG/1
TABLET ORAL
Qty: 21 TABLET | Refills: 0 | Status: SHIPPED | OUTPATIENT
Start: 2020-05-14 | End: 2020-10-19

## 2020-05-14 NOTE — TELEPHONE ENCOUNTER
Patient stated she forgot to ask Dr. Indira Chakraborty for a letter. 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 Cici Goldman. (5/28). Please upload letter to her mychart acct.

## 2020-05-14 NOTE — TELEPHONE ENCOUNTER
Please see pt's request below and advise. (FMLA or disability? ? Forms Department: phone [phone] and fax [phone]).

## 2020-05-14 NOTE — TELEPHONE ENCOUNTER
Appointment scheduled as requested.        Future Appointments   Date Time Provider Ana Rosa Blanca   5/19/2020  7:00 AM Christine Marrero 25 1 65 [facility]   5/28/2020  9:20 AM Rose Marie Pena MD [facility] [place]

## 2020-05-14 NOTE — PROGRESS NOTES
Mor Lopez is a 53year old female who presents for Patient presents with:  Consult  Abnormal Labs: + ALEXA  . This visit is conducted using Telemedicine with live, interactive video and audio.     Patient understands and accepts financial respons pain in her hips and knees. She was having swelling in her lower legs and she ordered all the labs. sherman gets patches on her skins with flares of joint pain. She is not sure if i'ts psoraisis. She can have it on numerous parts of her body.    She gets it a Disorder Father       Social History:  Social History    Tobacco Use      Smoking status: Former Smoker        Years: 2.00        Types: Cigarettes        Quit date: 1988        Years since quittin.4      Smokeless tobacco: Never Used      BusyFlow : CTAB, no crackles, no rhonchi  ABD: Soft   Joint exam:   Left 2nd and 4d mcps looks swollen. Right 3rd mcp =1 swleling   Close fists - left 2nd finger is always painful to bend   Left 5th finger can hurt.    Tender in right epicondyltisi laterally - goo TOTAL      6.1 - 8.1 g/dL  7.0   Albumin      3.6 - 5.1 g/dL  4.2   Globulin, Total      1.9 - 3.7 g/dL (calc)  2.8   ALBUMIN/GLOBULIN RATIO      1.0 - 2.5 (calc)  1.5   Total Bilirubin      0.2 - 1.2 mg/dL  1.4 (H)   Alkaline Phosphatase      37 - 153 U/L no physical exam could be performed. Every conscious effort was taken to allow for sufficient and adequate time. This billing was spent on reviewing labs, medications, radiology tests and decision making.   Appropriate medical decision-making and tests ar

## 2020-05-14 NOTE — PATIENT INSTRUCTIONS
1. Check labs  2. Return to clinic in 9:30am at may 28th in person  3. Check left leg ultrasound  - [phone] - to schedule   4.  Prednisone 10mg x 1 week, then 5mg x 1 week

## 2020-05-19 ENCOUNTER — MED REC SCAN ONLY (OUTPATIENT)
Dept: RHEUMATOLOGY | Facility: CLINIC | Age: 55
End: 2020-05-19

## 2020-05-21 ENCOUNTER — PATIENT MESSAGE (OUTPATIENT)
Dept: RHEUMATOLOGY | Facility: CLINIC | Age: 55
End: 2020-05-21

## 2020-05-21 NOTE — TELEPHONE ENCOUNTER
From: Ervin Velasquez  To: Damion Burch MD  Sent: 5/21/2020 9:04 AM CDT  Subject: Test Results Question    I would like to inquire on the results of my US Venous Doppler taken at [facility] on May 19th.    If the findings are nega

## 2020-05-28 ENCOUNTER — OFFICE VISIT (OUTPATIENT)
Dept: RHEUMATOLOGY | Facility: CLINIC | Age: 55
End: 2020-05-28
Payer: COMMERCIAL

## 2020-05-28 VITALS
HEIGHT: 62 IN | SYSTOLIC BLOOD PRESSURE: 112 MMHG | BODY MASS INDEX: 24.6 KG/M2 | HEART RATE: 56 BPM | WEIGHT: 133.69 LBS | DIASTOLIC BLOOD PRESSURE: 70 MMHG | RESPIRATION RATE: 16 BRPM

## 2020-05-28 DIAGNOSIS — M79.641 BILATERAL HAND PAIN: ICD-10-CM

## 2020-05-28 DIAGNOSIS — R21 RASH: ICD-10-CM

## 2020-05-28 DIAGNOSIS — M06.4 INFLAMMATORY POLYARTHRITIS (HCC): Primary | ICD-10-CM

## 2020-05-28 DIAGNOSIS — M79.642 BILATERAL HAND PAIN: ICD-10-CM

## 2020-05-28 DIAGNOSIS — M54.50 LOW BACK PAIN WITHOUT SCIATICA, UNSPECIFIED BACK PAIN LATERALITY, UNSPECIFIED CHRONICITY: ICD-10-CM

## 2020-05-28 DIAGNOSIS — R76.8 POSITIVE ANA (ANTINUCLEAR ANTIBODY): ICD-10-CM

## 2020-05-28 PROCEDURE — 99214 OFFICE O/P EST MOD 30 MIN: CPT | Performed by: INTERNAL MEDICINE

## 2020-05-28 RX ORDER — ZINC SULFATE 50(220)MG
220 CAPSULE ORAL DAILY
COMMUNITY
End: 2020-10-19

## 2020-05-28 RX ORDER — PREDNISONE 1 MG/1
5 TABLET ORAL DAILY
Qty: 30 TABLET | Refills: 0 | Status: SHIPPED | OUTPATIENT
Start: 2020-05-28 | End: 2020-10-19

## 2020-05-28 RX ORDER — FOLIC ACID 1 MG/1
1 TABLET ORAL DAILY
Qty: 90 TABLET | Refills: 3 | Status: SHIPPED | OUTPATIENT
Start: 2020-05-28 | End: 2020-10-19

## 2020-05-28 NOTE — PROGRESS NOTES
Cayden Perez is a 53year old female who presents for Patient presents with:  Lab Results  Medication Follow-Up  Knee Pain: Left, inside  Hand Pain: bilateral, burning, inflammation   Finger Pain: Left, pointer  Results: US  .    This visit is condu time. Then took it again that day later on and she got the same reaction. Then the mornign she was hurting again after skipping it one day  so she took it again. Her tongue and lips were not swelling and no breating issues.    She did feel she took it again it's not helping. shes using ice and stretching. With a flare, she gets flu , and joint pain. She had dry mouth with her last one. On her nails - she has flaky. ? Raynaud's for few years. . Get reddish look. Her tips hurt with cold.  increaeed over the Echocardiogram      History reviewed. No pertinent surgical history.    Family History   Problem Relation Age of Onset   • Thyroid disease Daughter         Congenital hypothryoid   • Breast Cancer Maternal Grandmother 72   • Stroke Maternal Grandmother disease, no hx of DM  Joint/Muscluskeltal: see HPI,   All other ROS are negative. EXAM:   /70 (BP Location: Left arm, Patient Position: Sitting, Cuff Size: adult)   Pulse 56   Resp 16   Ht 5' 2\" (1.575 m)   Wt 133 lb 11.2 oz (60.6 kg)   BMI 24. 4 CITRULLINATED$PEPTIDE (CCP) AB (IGG)      UNITS    TSH      mIU/L    SED RATE BY MODIFIED$WESTERGREN      < OR = 30 mm/h      Component      Latest Ref Rng & Units 5/2/2020 5/2/2020           7:58 AM  7:58 AM   CHLORIDE, SERUM      98 - 110 mmol/L  106   C Anti-Sjogren's A      Negative Negative   Anti-Sjogren's B      Negative Negative     12/14/2016 - DEXA  LEFT FEMORAL NECK               BMD:    0.601 gm/sq.  cm.            T SCORE:         -2.2       Z SCORE:         -1.4     LEFT TOTAL HIP 1mg aday   2. Return to clinic in 4 weeks. June 24th at 2:50 pm   3. Cont. Prednisone  5mg a day x 1 month   4. Note for work  5. Check xrays of si joint and hands.        Raghavendra Terrazas MD  5/28/2020   9:40 AM  - Reviewed [place]- information  through E

## 2020-05-28 NOTE — PATIENT INSTRUCTIONS
1. Consider psoriatic arthritis -   Trial of methotrexate 4 tablets once a week and folic acid 1mg aday   2. Return to clinic in 4 weeks. June 24th at 2:50 pm   3. Cont. Prednisone  5mg a day x 1 month   4. Note for work  5.  Check xrays of si joint and sorensen blistering, peeling or loosening of the skin, including inside the mouth  · signs of infection - fever or chills, cough, sore throat, pain or trouble passing urine  · signs and symptoms of bleeding such as bloody or black, tarry stools; red or dark-brown u away any unused medicine after the expiration date. What should I tell my health care provider before I take this medicine?   They need to know if you have any of these conditions:  · fluid in the stomach area or lungs  · if you often drink alcohol  · infe Women should inform their doctor if they wish to become pregnant or think they might be pregnant. Men should not father a child while taking this medicine and for 3 months after stopping it. There is a potential for serious side effects to an unborn child.

## 2020-05-31 ENCOUNTER — APPOINTMENT (OUTPATIENT)
Dept: GENERAL RADIOLOGY | Age: 55
End: 2020-05-31
Attending: EMERGENCY MEDICINE
Payer: COMMERCIAL

## 2020-05-31 ENCOUNTER — HOSPITAL ENCOUNTER (OUTPATIENT)
Age: 55
Discharge: HOME | End: 2020-05-31
Attending: EMERGENCY MEDICINE
Payer: COMMERCIAL

## 2020-05-31 VITALS
OXYGEN SATURATION: 100 % | HEART RATE: 60 BPM | RESPIRATION RATE: 16 BRPM | TEMPERATURE: 100 F | SYSTOLIC BLOOD PRESSURE: 115 MMHG | DIASTOLIC BLOOD PRESSURE: 83 MMHG

## 2020-05-31 DIAGNOSIS — M25.562 LEFT KNEE PAIN, UNSPECIFIED CHRONICITY: Primary | ICD-10-CM

## 2020-05-31 PROCEDURE — 99213 OFFICE O/P EST LOW 20 MIN: CPT

## 2020-05-31 PROCEDURE — 73560 X-RAY EXAM OF KNEE 1 OR 2: CPT | Performed by: EMERGENCY MEDICINE

## 2020-05-31 NOTE — ED INITIAL ASSESSMENT (HPI)
Ongoing left knee pain. No trauma. Had ultrasound done to r/o DVT. States she was walking today and had \"stabbing pain\" to medial left knee. Ambulating with limp. Painful ROM.

## 2020-05-31 NOTE — ED PROVIDER NOTES
Patient Seen in: [address]      History   Patient presents with:  Knee Pain    Stated Complaint: left knee pain    HPI    54 yo female with several months of left knee pain but today while walking had acute severe pain t rate and regular rhythm. Pulmonary:      Effort: Pulmonary effort is normal. No respiratory distress. Musculoskeletal:      Comments: Tender along the left knee medial joint line. Pain with range of motion. Distal neurovascular intact.     Skin:     Gen

## 2020-06-01 ENCOUNTER — TELEPHONE (OUTPATIENT)
Dept: INTERNAL MEDICINE CLINIC | Facility: CLINIC | Age: 55
End: 2020-06-01

## 2020-06-01 NOTE — TELEPHONE ENCOUNTER
Patient called and advised she has been dealing with her left knee (inside) issues since March. She advised she went on a walk yesterday and could not finish it. She ended up going to the ER in the middle of the walk as the pain was so bad. She would like to discuss a possible OPEN MRI & Ultrasound, however, she will need it done externally for financial reasons. Please Advise.

## 2020-06-03 NOTE — TELEPHONE ENCOUNTER
Patient has been contacted. Advised that an MRI should best be ordered through orthopedics. She has an upcoming appointment with Dr. Krista Che. This should be addressed by him. She is advised to take Tylenol, range at home and exercises with the knee has been discussed.

## 2020-06-05 ENCOUNTER — OFFICE VISIT (OUTPATIENT)
Dept: ORTHOPEDICS CLINIC | Facility: CLINIC | Age: 55
End: 2020-06-05
Payer: COMMERCIAL

## 2020-06-05 ENCOUNTER — TELEPHONE (OUTPATIENT)
Dept: ORTHOPEDICS CLINIC | Facility: CLINIC | Age: 55
End: 2020-06-05

## 2020-06-05 VITALS — SYSTOLIC BLOOD PRESSURE: 99 MMHG | RESPIRATION RATE: 18 BRPM | DIASTOLIC BLOOD PRESSURE: 56 MMHG | HEART RATE: 72 BPM

## 2020-06-05 DIAGNOSIS — M22.42 PATELLOFEMORAL CHONDROSIS OF LEFT KNEE: Primary | ICD-10-CM

## 2020-06-05 PROCEDURE — 99244 OFF/OP CNSLTJ NEW/EST MOD 40: CPT | Performed by: ORTHOPAEDIC SURGERY

## 2020-06-05 NOTE — PROGRESS NOTES
NURSING INTAKE COMMENTS: Patient presents with:  Knee Pain: Left - onset in March - no injury - has pain on the medial aspect of the knee mostly , has swelling in the knee on and off, no numbness or tingling , has x-rays in the system - rates pain as 5-9/1 Oral Tab Take 1 tablet (5 mg total) by mouth daily.  30 tablet 0   • predniSONE 5 MG Oral Tab Take 10mg x 1 week, then 5mg x 1 week, then off 21 tablet 0   • Calcium 250 MG Oral Cap      • magnesium 250 MG Oral Tab      • Cholecalciferol (VITAMIN D3) 2000 U urinating  MUSCULOSKELETAL: denies musculoskeletal complaints other than in HPI  NEURO: denies numbness, tingling, weakness, balance issues, dizziness, memory loss  PSYCHIATRIC: denies Hx of depression, anxiety, other psychiatric disorders  HEMATOLOGIC: de (CST): Grayson Wilcox MD on 5/31/2020 at 1:04 PM          Ultrasound Test Scan    Result Date: 6/4/2020  Ordered by an unspecified provider.        Labs:  Lab Results   Component Value Date    WBC 6.2 05/02/2020    HGB 14.4 05/02/2020     05/02/2020

## 2020-06-11 ENCOUNTER — PATIENT MESSAGE (OUTPATIENT)
Dept: ORTHOPEDICS CLINIC | Facility: CLINIC | Age: 55
End: 2020-06-11

## 2020-06-11 NOTE — TELEPHONE ENCOUNTER
[facility] See message below from today. Was this authorized for open MRI?

## 2020-06-11 NOTE — TELEPHONE ENCOUNTER
From: Ericka Mendez  To: Chinmay Dsouza MD  Sent: 6/11/2020 2:35 PM CDT  Subject: Prescription Question    Please make available in mychart order for MRI left knee. Auth in file/ approved .   [id number]  Having Open MRI at [facility]

## 2020-06-11 NOTE — TELEPHONE ENCOUNTER
Yes, this has been approved at [address] [place] [address] in [place] because they have open MRI available. Auth # [id number] is valid until 7/7/2020.

## 2020-06-12 ENCOUNTER — TELEPHONE (OUTPATIENT)
Dept: RHEUMATOLOGY | Facility: CLINIC | Age: 55
End: 2020-06-12

## 2020-06-12 NOTE — TELEPHONE ENCOUNTER
Patient is requesting imagining Dr. Ric Mcdermott ordered on 5/28 to be faxed to [facility] imaging center. Patient is also requesting order be published to [employer]. Please advise. Fax [phone].

## 2020-06-12 NOTE — TELEPHONE ENCOUNTER
Orders faxed to [facility] at [phone]. Unable to send order via My Chart; copy of orders mailed to pt.

## 2020-06-17 NOTE — TELEPHONE ENCOUNTER
Spoke to pt and informed her that Addis Richards has MRI results and will review them today. Appt scheduled with Addis Richards for tomorrow at 8:00AM at [facility]. Pt verbalized understanding.

## 2020-06-18 ENCOUNTER — OFFICE VISIT (OUTPATIENT)
Dept: ORTHOPEDICS CLINIC | Facility: CLINIC | Age: 55
End: 2020-06-18
Payer: COMMERCIAL

## 2020-06-18 VITALS — BODY MASS INDEX: 24.84 KG/M2 | WEIGHT: 135 LBS | HEIGHT: 62 IN

## 2020-06-18 DIAGNOSIS — M17.12 PRIMARY OSTEOARTHRITIS OF LEFT KNEE: ICD-10-CM

## 2020-06-18 DIAGNOSIS — S83.232D COMPLEX TEAR OF MEDIAL MENISCUS OF LEFT KNEE AS CURRENT INJURY, SUBSEQUENT ENCOUNTER: Primary | ICD-10-CM

## 2020-06-18 PROCEDURE — 99212 OFFICE O/P EST SF 10 MIN: CPT | Performed by: ORTHOPAEDIC SURGERY

## 2020-06-18 NOTE — PROGRESS NOTES
NURSING INTAKE COMMENTS: Patient presents with:  Knee Pain: Left f/u  and MRI results  - has a disc and report of MRI - has pain rated as 3/10 at all the time with spikes of pain up to 7/99 with certain movements       HPI: This 53year old female presents Comment:Blurred vision  Pcn [Penicillins]       HIVES  Pine Oil  [Pine Tar]    UNKNOWN  Family History   Problem Relation Age of Onset   • Thyroid disease Daughter         Congenital hypothryoid   • Breast Cancer Maternal Grandmother 72   • Stroke Materna 24.69 kg/m²   Constitutional: appears well hydrated, alert and responsive, no acute distress noted  Extremities: Left knee alignment unremarkable. No effusion. Mild tenderness over the mid medial, posterior medial joint line.   Mildly tender over medial t  05/02/2020      Lab Results   Component Value Date    GLU 85 05/02/2020    BUN 19 05/02/2020    CREATSERUM 0.77 05/02/2020    GFRNAA 87 05/02/2020    GFRAA 101 05/02/2020        Assessment and Plan:  Diagnoses and all orders for this visit:    Comp

## 2020-07-08 ENCOUNTER — PATIENT MESSAGE (OUTPATIENT)
Dept: RHEUMATOLOGY | Facility: CLINIC | Age: 55
End: 2020-07-08

## 2020-07-09 NOTE — TELEPHONE ENCOUNTER
[facility], spoke with Deidra - SI joint xray report will be faxed to [phone] for provider review.

## 2020-07-09 NOTE — TELEPHONE ENCOUNTER
From: Per De Oliveira  To: Cyndi Lyon MD  Sent: 7/8/2020 7:04 PM CDT  Subject: Test Results Question    There is a letter in my chart saying that there is still the pending test for my SI joints x-ray you should have a copy of that x-ray

## 2020-07-24 ENCOUNTER — PATIENT MESSAGE (OUTPATIENT)
Dept: RHEUMATOLOGY | Facility: CLINIC | Age: 55
End: 2020-07-24

## 2020-07-24 NOTE — TELEPHONE ENCOUNTER
From: Indira Dexter  To: Adrianne Kimble MD  Sent: 7/24/2020 10:58 AM CDT  Subject: Other    Good morning Dr Rouse Or,    I am requesting a work release letter so that I may return to work beginning today.    Can you please provide the letter

## 2020-07-30 ENCOUNTER — TELEMEDICINE (OUTPATIENT)
Dept: RHEUMATOLOGY | Facility: CLINIC | Age: 55
End: 2020-07-30

## 2020-07-30 DIAGNOSIS — M25.552 CHRONIC PAIN OF BOTH HIPS: ICD-10-CM

## 2020-07-30 DIAGNOSIS — S83.204S: ICD-10-CM

## 2020-07-30 DIAGNOSIS — G89.29 CHRONIC PAIN OF BOTH HIPS: ICD-10-CM

## 2020-07-30 DIAGNOSIS — M54.50 CHRONIC LOW BACK PAIN WITHOUT SCIATICA, UNSPECIFIED BACK PAIN LATERALITY: ICD-10-CM

## 2020-07-30 DIAGNOSIS — G89.29 CHRONIC NECK PAIN: ICD-10-CM

## 2020-07-30 DIAGNOSIS — M25.551 CHRONIC PAIN OF BOTH HIPS: ICD-10-CM

## 2020-07-30 DIAGNOSIS — M54.2 CHRONIC NECK PAIN: ICD-10-CM

## 2020-07-30 DIAGNOSIS — M25.50 POLYARTHRALGIA: Primary | ICD-10-CM

## 2020-07-30 DIAGNOSIS — G89.29 CHRONIC LOW BACK PAIN WITHOUT SCIATICA, UNSPECIFIED BACK PAIN LATERALITY: ICD-10-CM

## 2020-07-30 PROCEDURE — 99214 OFFICE O/P EST MOD 30 MIN: CPT | Performed by: INTERNAL MEDICINE

## 2020-07-30 NOTE — PATIENT INSTRUCTIONS
1. physical therapy for her lower back and neck - hips and left knee   2. Note for work  3.  Return to clinic in 6 months -

## 2020-07-30 NOTE — TELEPHONE ENCOUNTER
Patient contacted and offered appointment for video visit today at 12:40 pm. Patient accepted appointment.

## 2020-07-30 NOTE — PROGRESS NOTES
Pairsh Barrios is a 53year old female who presents for No chief complaint on file. .   This visit is conducted using Telemedicine with live, interactive video and audio.     Patient understands and accepts financial responsibility for any deductible She got a reactions of tingling to her tongue and lips 30min after taking diclofenac for the first time. Then took it again that day later on and she got the same reaction.  Then the mornign she was hurting again after skipping it one day  so she took it ag She has about 4-5/10 pain at rest. But with walking the pain goes to 7-8/10. Going up the staris 6/10. She is not taking anything. She's not taking advil - it's not helping. shes using ice and stretching. With a flare, she gets flu , and joint pain.  Sh She she was recommened treatments is not able to afford - recommened strem cell thearpy and chiropracter care. se feels she needs the chiropracter care.      When she is standing for long periods of time, she feels that's when she has pain in her neck, a • predniSONE 5 MG Oral Tab Take 1 tablet (5 mg total) by mouth daily.  30 tablet 0   • predniSONE 5 MG Oral Tab Take 10mg x 1 week, then 5mg x 1 week, then off 21 tablet 0   • Calcium 250 MG Oral Cap      • magnesium 250 MG Oral Tab      • Cholecalciferol ( HEENT: has dry eyes, dry mouth x 1 time , ?  Raynaud's, hands can hurt in the cold and turn red, no nasal ulcers, no parotid swelling, no neck pain, gets intermitte jaw pain in the last few weeks, , no temple pain  Eyes: No visual changes,   CVS:  gets flu 7:58 AM   CHLORIDE, SERUM      98 - 110 mmol/L    CARBON DIOXIDE      20 - 32 mmol/L    CALCIUM      8.6 - 10.4 mg/dL    PROTEIN, TOTAL      6.1 - 8.1 g/dL    Albumin      3.6 - 5.1 g/dL    Globulin, Total      1.9 - 3.7 g/dL (calc)    ALBUMIN/GLOBUL Cardiolipin IgG Antibody      <10 GPL 1.6   Cardiolipin IgM Antibody      <10 MPL 1.8   BETA 2 GLYCOPROTEIN 1 AB, IgG      <7 U/mL 0.9   BETA 2 GLYCOPROTEIN 1 AB, IgM      <7 U/mL <2.9   ALDOLASE, SERUM      1.5 - 8.1 U/L 2.9   CK      26 - 192 U/L 53   C- 50% better on prednisone 10mg and 5gmg - off of it in 6/2020    - she states meloxicam 15mg a day - didn't work for her in 2014 -  - - didn't work -  diclofenac 75mg twice a day   -  Never did  Trial of methotrexate 4 tablets once a week and folic acid 1mg

## 2020-07-30 NOTE — TELEPHONE ENCOUNTER
Patient calling to check the status of the letter she is requesting she states she have not heard anything from no one.   She states can it be put mychart so she can seen it to her employer right away         Please advise   [phone]

## 2020-07-30 NOTE — TELEPHONE ENCOUNTER
Pt. Missed her 6/24 video appt.  To reasses her status -   I think I am going to need to see what's going on with her on mtx low dose - that was started in end of may   She also saw orthopedics - so not sure what the note is exactly going to be pertaining t

## 2020-09-24 ENCOUNTER — NURSE TRIAGE (OUTPATIENT)
Dept: INTERNAL MEDICINE CLINIC | Facility: CLINIC | Age: 55
End: 2020-09-24

## 2020-09-24 ENCOUNTER — OFFICE VISIT (OUTPATIENT)
Dept: FAMILY MEDICINE CLINIC | Facility: CLINIC | Age: 55
End: 2020-09-24
Payer: COMMERCIAL

## 2020-09-24 VITALS
BODY MASS INDEX: 24.48 KG/M2 | HEIGHT: 62 IN | SYSTOLIC BLOOD PRESSURE: 113 MMHG | WEIGHT: 133 LBS | DIASTOLIC BLOOD PRESSURE: 73 MMHG | HEART RATE: 47 BPM | TEMPERATURE: 97 F

## 2020-09-24 DIAGNOSIS — Z12.11 SCREENING FOR MALIGNANT NEOPLASM OF COLON: ICD-10-CM

## 2020-09-24 DIAGNOSIS — Z12.31 ENCOUNTER FOR SCREENING MAMMOGRAM FOR BREAST CANCER: ICD-10-CM

## 2020-09-24 DIAGNOSIS — R10.11 RUQ PAIN: Primary | ICD-10-CM

## 2020-09-24 PROCEDURE — 3008F BODY MASS INDEX DOCD: CPT | Performed by: STUDENT IN AN ORGANIZED HEALTH CARE EDUCATION/TRAINING PROGRAM

## 2020-09-24 PROCEDURE — 3078F DIAST BP <80 MM HG: CPT | Performed by: STUDENT IN AN ORGANIZED HEALTH CARE EDUCATION/TRAINING PROGRAM

## 2020-09-24 PROCEDURE — 99214 OFFICE O/P EST MOD 30 MIN: CPT | Performed by: STUDENT IN AN ORGANIZED HEALTH CARE EDUCATION/TRAINING PROGRAM

## 2020-09-24 PROCEDURE — 3074F SYST BP LT 130 MM HG: CPT | Performed by: STUDENT IN AN ORGANIZED HEALTH CARE EDUCATION/TRAINING PROGRAM

## 2020-09-24 NOTE — TELEPHONE ENCOUNTER
Pt reports having pain 2 fingers below rib cage on right side (RUQ). States had tenderness in that area previously (believes originally ~2016) and discussed with Dr Ronn Lainez but states over the last 2 months has noticed it more.  Believes previously just advi

## 2020-09-24 NOTE — PROGRESS NOTES
HPI:    Patient ID: Tung Bansal is a 53year old female. HPI  Pt presenting with RUQ pain. Reports tenderness over the last 4 years, aggravated by high waisted pants.  Notes that pain has worsened over the last month, especially after eating red HIVES  Pine Oil  [Pine Tar]    UNKNOWN    09/24/20  1648   BP: 113/73   Pulse: (!) 47   Temp: 97 °F (36.1 °C)   TempSrc: Temporal   Weight: 133 lb (60.3 kg)   Height: 5' 2\" (1.575 m)       Body mass index is 24.33 kg/m².    PHYSICAL EXAM:   Physical Exam ASSESSMENT/PLAN:   1. RUQ pain  Differential includes GB colic, cholelithiasis, LEAL, pancreas  - will check labs and imaging below  - can take NSAIDs for pain as needed  - to call with any questions or concerns  - discussed red flags for urgen

## 2020-09-27 LAB
ABSOLUTE BASOPHILS: 19 CELLS/UL (ref 0–200)
ABSOLUTE EOSINOPHILS: 52 CELLS/UL (ref 15–500)
ABSOLUTE LYMPHOCYTES: 1481 CELLS/UL (ref 850–3900)
ABSOLUTE MONOCYTES: 400 CELLS/UL (ref 200–950)
ABSOLUTE NEUTROPHILS: 2750 CELLS/UL (ref 1500–7800)
ALBUMIN/GLOBULIN RATIO: 1.5 (CALC) (ref 1–2.5)
ALBUMIN: 4.1 G/DL (ref 3.6–5.1)
ALKALINE PHOSPHATASE: 86 U/L (ref 37–153)
ALT: 10 U/L (ref 6–29)
AST: 17 U/L (ref 10–35)
BASOPHILS: 0.4 %
BILIRUBIN, TOTAL: 0.9 MG/DL (ref 0.2–1.2)
BUN: 14 MG/DL (ref 7–25)
CALCIUM: 9.7 MG/DL (ref 8.6–10.4)
CARBON DIOXIDE: 30 MMOL/L (ref 20–32)
CHLORIDE: 107 MMOL/L (ref 98–110)
CHOL/HDLC RATIO: 3.7 (CALC)
CHOLESTEROL, TOTAL: 183 MG/DL
CREATININE: 0.68 MG/DL (ref 0.5–1.05)
EGFR IF AFRICN AM: 114 ML/MIN/1.73M2
EGFR IF NONAFRICN AM: 98 ML/MIN/1.73M2
EOSINOPHILS: 1.1 %
GLOBULIN: 2.7 G/DL (CALC) (ref 1.9–3.7)
GLUCOSE: 86 MG/DL (ref 65–99)
HDL CHOLESTEROL: 50 MG/DL
HEMATOCRIT: 43.4 % (ref 35–45)
HEMOGLOBIN: 14.4 G/DL (ref 11.7–15.5)
LDL-CHOLESTEROL: 117 MG/DL (CALC)
LIPASE: 21 U/L (ref 7–60)
LYMPHOCYTES: 31.5 %
MCH: 31.4 PG (ref 27–33)
MCHC: 33.2 G/DL (ref 32–36)
MCV: 94.6 FL (ref 80–100)
MONOCYTES: 8.5 %
MPV: 10.5 FL (ref 7.5–12.5)
NEUTROPHILS: 58.5 %
NON-HDL CHOLESTEROL: 133 MG/DL (CALC)
PLATELET COUNT: 203 THOUSAND/UL (ref 140–400)
POTASSIUM: 5.2 MMOL/L (ref 3.5–5.3)
PROTEIN, TOTAL: 6.8 G/DL (ref 6.1–8.1)
RDW: 11.9 % (ref 11–15)
RED BLOOD CELL COUNT: 4.59 MILLION/UL (ref 3.8–5.1)
SODIUM: 142 MMOL/L (ref 135–146)
TRIGLYCERIDES: 64 MG/DL
WHITE BLOOD CELL COUNT: 4.7 THOUSAND/UL (ref 3.8–10.8)

## 2020-10-02 ENCOUNTER — NURSE TRIAGE (OUTPATIENT)
Dept: INTERNAL MEDICINE CLINIC | Facility: CLINIC | Age: 55
End: 2020-10-02

## 2020-10-02 NOTE — TELEPHONE ENCOUNTER
Action Requested: Summary for Provider     []  Critical Lab, Recommendations Needed  [] Need Additional Advice  []   FYI    []   Need Orders  [] Need Medications Sent to Pharmacy  []  Other     SUMMARY: right flank area pain for 2 days.  Thinks she is passi

## 2020-10-03 ENCOUNTER — ORDER TRANSCRIPTION (OUTPATIENT)
Dept: ADMINISTRATIVE | Facility: HOSPITAL | Age: 55
End: 2020-10-03

## 2020-10-03 DIAGNOSIS — Z13.9 ENCOUNTER FOR SCREENING: Primary | ICD-10-CM

## 2020-10-05 NOTE — TELEPHONE ENCOUNTER
I have tried to call this patient x2, phone rings but does not . I reviewed her notes from Dr. Alfred Glaser and all work-up has already been completed. I would advise to drink plenty of fluids and complete the CT scan for an evaluation.   May need to s

## 2020-10-06 NOTE — TELEPHONE ENCOUNTER
Left message to call back. Please transfer to triage. 2nd attempt. I have also sent patient a [employer]BD[employer] message to call us back.

## 2020-10-06 NOTE — TELEPHONE ENCOUNTER
Advised patient on Dr. Danielle Mcguire information and recommendations. She is in process of scheduling the CT, and she will call back if needed for Urology referral. Patient verbalized understanding.

## 2020-10-08 ENCOUNTER — MED REC SCAN ONLY (OUTPATIENT)
Dept: FAMILY MEDICINE CLINIC | Facility: CLINIC | Age: 55
End: 2020-10-08

## 2020-10-08 ENCOUNTER — TELEPHONE (OUTPATIENT)
Dept: FAMILY MEDICINE CLINIC | Facility: CLINIC | Age: 55
End: 2020-10-08

## 2020-10-08 NOTE — TELEPHONE ENCOUNTER
Doctor's order for imaging successfully faxed to [facility]. Fax number [phone]. Confirmation number placed in scanning.

## 2020-10-10 ENCOUNTER — HOSPITAL ENCOUNTER (OUTPATIENT)
Dept: ULTRASOUND IMAGING | Age: 55
Discharge: HOME | End: 2020-10-10
Attending: STUDENT IN AN ORGANIZED HEALTH CARE EDUCATION/TRAINING PROGRAM
Payer: COMMERCIAL

## 2020-10-10 DIAGNOSIS — R10.11 RUQ PAIN: ICD-10-CM

## 2020-10-10 PROCEDURE — 76705 ECHO EXAM OF ABDOMEN: CPT | Performed by: STUDENT IN AN ORGANIZED HEALTH CARE EDUCATION/TRAINING PROGRAM

## 2020-10-15 ENCOUNTER — TELEPHONE (OUTPATIENT)
Dept: FAMILY MEDICINE CLINIC | Facility: CLINIC | Age: 55
End: 2020-10-15

## 2020-10-15 NOTE — TELEPHONE ENCOUNTER
OV notes and US report successfully faxed to Dr Je Kapoor. Fax # [phone]. Telephone number [phone]. Telephone number provided by pt was [employer]. It was number for referral department.

## 2020-10-17 ENCOUNTER — HOSPITAL ENCOUNTER (OUTPATIENT)
Dept: MAMMOGRAPHY | Age: 55
Discharge: HOME | End: 2020-10-17
Attending: STUDENT IN AN ORGANIZED HEALTH CARE EDUCATION/TRAINING PROGRAM
Payer: COMMERCIAL

## 2020-10-17 DIAGNOSIS — Z12.31 ENCOUNTER FOR SCREENING MAMMOGRAM FOR BREAST CANCER: ICD-10-CM

## 2020-10-17 PROCEDURE — 77063 BREAST TOMOSYNTHESIS BI: CPT | Performed by: STUDENT IN AN ORGANIZED HEALTH CARE EDUCATION/TRAINING PROGRAM

## 2020-10-17 PROCEDURE — 77067 SCR MAMMO BI INCL CAD: CPT | Performed by: STUDENT IN AN ORGANIZED HEALTH CARE EDUCATION/TRAINING PROGRAM

## 2020-11-24 ENCOUNTER — HOSPITAL ENCOUNTER (OUTPATIENT)
Age: 55
Discharge: HOME | End: 2020-11-24
Attending: EMERGENCY MEDICINE
Payer: COMMERCIAL

## 2020-11-24 VITALS
RESPIRATION RATE: 16 BRPM | DIASTOLIC BLOOD PRESSURE: 68 MMHG | TEMPERATURE: 99 F | OXYGEN SATURATION: 98 % | HEART RATE: 57 BPM | SYSTOLIC BLOOD PRESSURE: 123 MMHG

## 2020-11-24 DIAGNOSIS — R51.9 NONINTRACTABLE HEADACHE, UNSPECIFIED CHRONICITY PATTERN, UNSPECIFIED HEADACHE TYPE: Primary | ICD-10-CM

## 2020-11-24 DIAGNOSIS — Z20.822 ENCOUNTER FOR LABORATORY TESTING FOR COVID-19 VIRUS: ICD-10-CM

## 2020-11-24 DIAGNOSIS — J02.9 ACUTE VIRAL PHARYNGITIS: ICD-10-CM

## 2020-11-24 PROCEDURE — 99213 OFFICE O/P EST LOW 20 MIN: CPT

## 2020-11-25 NOTE — ED PROVIDER NOTES
Patient Seen in: [facility] [place]      History   Patient presents with:  Covid-19 Test: My partner has tested positive. I currently have headaches, eyes hurt. - Entered by patient    Stated Complaint: Covid-19 Test - My partner has tested positive. SpO2 98%         Physical Exam  Vitals signs and nursing note reviewed. Constitutional:       General: She is not in acute distress. Appearance: She is well-developed. HENT:      Head: Normocephalic.       Mouth/Throat:      Mouth: Mucous membrane List

## 2020-11-28 ENCOUNTER — TELEPHONE (OUTPATIENT)
Dept: INTERNAL MEDICINE CLINIC | Facility: CLINIC | Age: 55
End: 2020-11-28

## 2020-11-28 DIAGNOSIS — R50.9 FEVER, UNSPECIFIED FEVER CAUSE: Primary | ICD-10-CM

## 2020-11-28 NOTE — TELEPHONE ENCOUNTER
Spoke with the patient who reports she was seen in the urgent care clinic on 11/24/20 due to headaches and low grade fevers. Patient reports her temperatures have been between 100-101.5F. She reports her eyes hurt, she has nausea and dizziness.  She reports

## 2020-11-28 NOTE — TELEPHONE ENCOUNTER
Call pt. We should retest for COVID. Her first test may have been negative because it was too early in the course of the disease. Lab order generated. She can contact central scheduling and arrange the testing.

## 2020-11-30 ENCOUNTER — APPOINTMENT (OUTPATIENT)
Dept: LAB | Age: 55
End: 2020-11-30
Attending: INTERNAL MEDICINE
Payer: COMMERCIAL

## 2020-11-30 DIAGNOSIS — R50.9 FEVER, UNSPECIFIED FEVER CAUSE: ICD-10-CM

## 2020-12-02 ENCOUNTER — PATIENT MESSAGE (OUTPATIENT)
Dept: INTERNAL MEDICINE CLINIC | Facility: CLINIC | Age: 55
End: 2020-12-02

## 2020-12-03 NOTE — TELEPHONE ENCOUNTER
From: Indira Dexter  To: Mai Asencio MD  Sent: 12/2/2020 7:25 PM CST  Subject: Test Results Question    I have had 2 non detected civid tests.  I have been ill since 11/ 24th with intense headaches , fever ranging from 100.4 to 101. 5  Body aches,

## 2020-12-05 ENCOUNTER — LAB ENCOUNTER (OUTPATIENT)
Dept: LAB | Age: 55
End: 2020-12-05
Attending: PHYSICIAN ASSISTANT
Payer: COMMERCIAL

## 2020-12-05 ENCOUNTER — TELEPHONE (OUTPATIENT)
Dept: INTERNAL MEDICINE CLINIC | Facility: CLINIC | Age: 55
End: 2020-12-05

## 2020-12-05 ENCOUNTER — TELEMEDICINE (OUTPATIENT)
Dept: INTERNAL MEDICINE CLINIC | Facility: CLINIC | Age: 55
End: 2020-12-05
Payer: COMMERCIAL

## 2020-12-05 DIAGNOSIS — K06.8 BLEEDING GUMS: ICD-10-CM

## 2020-12-05 DIAGNOSIS — R61 NIGHT SWEATS: ICD-10-CM

## 2020-12-05 DIAGNOSIS — R06.02 SHORTNESS OF BREATH: ICD-10-CM

## 2020-12-05 DIAGNOSIS — K06.8 BLEEDING GUMS: Primary | ICD-10-CM

## 2020-12-05 PROCEDURE — 85610 PROTHROMBIN TIME: CPT

## 2020-12-05 PROCEDURE — 99213 OFFICE O/P EST LOW 20 MIN: CPT | Performed by: PHYSICIAN ASSISTANT

## 2020-12-05 PROCEDURE — 93005 ELECTROCARDIOGRAM TRACING: CPT

## 2020-12-05 PROCEDURE — 85025 COMPLETE CBC W/AUTO DIFF WBC: CPT

## 2020-12-05 PROCEDURE — 36415 COLL VENOUS BLD VENIPUNCTURE: CPT

## 2020-12-05 PROCEDURE — 86141 C-REACTIVE PROTEIN HS: CPT

## 2020-12-05 PROCEDURE — 93010 ELECTROCARDIOGRAM REPORT: CPT | Performed by: PHYSICIAN ASSISTANT

## 2020-12-05 PROCEDURE — 85652 RBC SED RATE AUTOMATED: CPT

## 2020-12-05 NOTE — PROGRESS NOTES
Virtual Telephone Check-In    Nidia Silver verbally consents to a Virtual/Telephone Check-In visit on 12/05/20. Patient has been referred to the [facility] website at [email or web address] to review the yearly Consent to Treat document.     Patient un

## 2020-12-05 NOTE — TELEPHONE ENCOUNTER
RN on site to please call patient to further discuss. Also routed to PCP prior to RN triage for recommendations.

## 2020-12-05 NOTE — TELEPHONE ENCOUNTER
----- Message from [address] sent at 12/5/2020  4:57 AM CST -----  Regarding: RE: Test Results Question  Contact: [phone]  Good morning. I have not heard back regarding my symptoms and 2 negative Covid tests.     I have been not well sinc

## 2020-12-05 NOTE — TELEPHONE ENCOUNTER
Patient  calling in regards to [employer] message below    Night sweat for  The past few nights, very tired, fatigues easily , lost 6 lbs since 11/24 , has not had diarrhea or vomiting but some nausea , lightheaded and slight shortness of breath when walking

## 2021-01-21 ENCOUNTER — OFFICE VISIT (OUTPATIENT)
Dept: FAMILY MEDICINE CLINIC | Facility: CLINIC | Age: 56
End: 2021-01-21
Payer: COMMERCIAL

## 2021-01-21 VITALS
HEART RATE: 56 BPM | DIASTOLIC BLOOD PRESSURE: 64 MMHG | TEMPERATURE: 98 F | SYSTOLIC BLOOD PRESSURE: 125 MMHG | WEIGHT: 134 LBS | BODY MASS INDEX: 23.74 KG/M2 | HEIGHT: 63 IN

## 2021-01-21 DIAGNOSIS — M19.90 ARTHRITIS: ICD-10-CM

## 2021-01-21 DIAGNOSIS — H65.01 RIGHT ACUTE SEROUS OTITIS MEDIA, RECURRENCE NOT SPECIFIED: ICD-10-CM

## 2021-01-21 DIAGNOSIS — H92.01 ACUTE EAR PAIN, RIGHT: Primary | ICD-10-CM

## 2021-01-21 PROCEDURE — 99213 OFFICE O/P EST LOW 20 MIN: CPT | Performed by: STUDENT IN AN ORGANIZED HEALTH CARE EDUCATION/TRAINING PROGRAM

## 2021-01-21 PROCEDURE — 3008F BODY MASS INDEX DOCD: CPT | Performed by: STUDENT IN AN ORGANIZED HEALTH CARE EDUCATION/TRAINING PROGRAM

## 2021-01-21 PROCEDURE — 3078F DIAST BP <80 MM HG: CPT | Performed by: STUDENT IN AN ORGANIZED HEALTH CARE EDUCATION/TRAINING PROGRAM

## 2021-01-21 PROCEDURE — 3074F SYST BP LT 130 MM HG: CPT | Performed by: STUDENT IN AN ORGANIZED HEALTH CARE EDUCATION/TRAINING PROGRAM

## 2021-01-21 RX ORDER — ACETAMINOPHEN 500 MG
TABLET ORAL
COMMUNITY
Start: 2020-12-03 | End: 2021-09-23 | Stop reason: ALTCHOICE

## 2021-01-21 NOTE — PROGRESS NOTES
HPI:    Patient ID: Peri Sanchez is a 63year old female. HPI  Pt presenting with Right ear pain for the last week. She initially noted Right sided sinus pressure and neck tenderness that has since resolved, but has ongoing Right ear pressure.  Jose De Jesus Arriaza tenderness. Cardiovascular:      Rate and Rhythm: Normal rate. Rhythm irregular. Heart sounds: Normal heart sounds, S1 normal and S2 normal. No murmur. Pulmonary:      Effort: Pulmonary effort is normal. No respiratory distress.       Breath sounds

## 2021-03-12 ENCOUNTER — NURSE TRIAGE (OUTPATIENT)
Dept: INTERNAL MEDICINE CLINIC | Facility: CLINIC | Age: 56
End: 2021-03-12

## 2021-03-12 NOTE — TELEPHONE ENCOUNTER
Action Requested: Summary for Provider     []  Critical Lab, Recommendations Needed  [] Need Additional Advice  []   FYI    []   Need Orders  [] Need Medications Sent to Pharmacy  []  Other     SUMMARY: advise per protocol, ov within 3 days  Future Lily

## 2021-03-27 ENCOUNTER — OFFICE VISIT (OUTPATIENT)
Dept: FAMILY MEDICINE CLINIC | Facility: CLINIC | Age: 56
End: 2021-03-27
Payer: COMMERCIAL

## 2021-03-27 ENCOUNTER — NURSE TRIAGE (OUTPATIENT)
Dept: INTERNAL MEDICINE CLINIC | Facility: CLINIC | Age: 56
End: 2021-03-27

## 2021-03-27 VITALS
DIASTOLIC BLOOD PRESSURE: 68 MMHG | RESPIRATION RATE: 18 BRPM | HEIGHT: 63 IN | HEART RATE: 58 BPM | OXYGEN SATURATION: 98 % | SYSTOLIC BLOOD PRESSURE: 114 MMHG | BODY MASS INDEX: 23.57 KG/M2 | WEIGHT: 133 LBS

## 2021-03-27 DIAGNOSIS — I05.9 MITRAL VALVE DISORDER: ICD-10-CM

## 2021-03-27 DIAGNOSIS — R00.0 TACHYCARDIA: Primary | ICD-10-CM

## 2021-03-27 PROBLEM — Z20.822 CLOSE EXPOSURE TO COVID-19 VIRUS: Status: RESOLVED | Noted: 2020-04-27 | Resolved: 2021-03-27

## 2021-03-27 PROCEDURE — 3078F DIAST BP <80 MM HG: CPT | Performed by: PHYSICIAN ASSISTANT

## 2021-03-27 PROCEDURE — 99213 OFFICE O/P EST LOW 20 MIN: CPT | Performed by: PHYSICIAN ASSISTANT

## 2021-03-27 PROCEDURE — 3074F SYST BP LT 130 MM HG: CPT | Performed by: PHYSICIAN ASSISTANT

## 2021-03-27 PROCEDURE — 3008F BODY MASS INDEX DOCD: CPT | Performed by: PHYSICIAN ASSISTANT

## 2021-03-27 NOTE — TELEPHONE ENCOUNTER
Action Requested: Summary for Provider     []  Critical Lab, Recommendations Needed  [] Need Additional Advice  []   FYI    []   Need Orders  [] Need Medications Sent to Pharmacy  []  Other     SUMMARY: Patient requesting appt today, scheduled for appt wit

## 2021-03-27 NOTE — PROGRESS NOTES
HPI:    Patient ID: Sal Sheppard is a 63year old female. Patient presents with headaches and tachycardia for the past 1 week. Has woken up in her sleep by it 2 times this week. Does have hx of irregular rhythm. and some chest pains.  No fever/ch breath sounds. No wheezing or rales. Musculoskeletal:      Cervical back: Normal range of motion and neck supple. Lymphadenopathy:      Cervical: No cervical adenopathy. Skin:     General: Skin is warm and dry.    Neurological:      Mental Status: She

## 2021-04-01 ENCOUNTER — LAB ENCOUNTER (OUTPATIENT)
Dept: LAB | Facility: HOSPITAL | Age: 56
End: 2021-04-01
Attending: PHYSICIAN ASSISTANT
Payer: COMMERCIAL

## 2021-04-01 DIAGNOSIS — R00.0 TACHYCARDIA: Primary | ICD-10-CM

## 2021-04-01 PROCEDURE — 36415 COLL VENOUS BLD VENIPUNCTURE: CPT | Performed by: PHYSICIAN ASSISTANT

## 2021-04-01 PROCEDURE — 84443 ASSAY THYROID STIM HORMONE: CPT | Performed by: PHYSICIAN ASSISTANT

## 2021-04-01 PROCEDURE — 93010 ELECTROCARDIOGRAM REPORT: CPT | Performed by: PHYSICIAN ASSISTANT

## 2021-04-01 PROCEDURE — 84439 ASSAY OF FREE THYROXINE: CPT | Performed by: PHYSICIAN ASSISTANT

## 2021-04-01 PROCEDURE — 93005 ELECTROCARDIOGRAM TRACING: CPT

## 2021-04-07 ENCOUNTER — TELEPHONE (OUTPATIENT)
Dept: FAMILY MEDICINE CLINIC | Facility: CLINIC | Age: 56
End: 2021-04-07

## 2021-04-07 NOTE — TELEPHONE ENCOUNTER
Jack from [facility] [employer] called to state that the patient will need an authorization required thru [employer] - [phone], for the Echo Doppler.

## 2021-04-08 ENCOUNTER — HOSPITAL ENCOUNTER (OUTPATIENT)
Dept: CV DIAGNOSTICS | Facility: HOSPITAL | Age: 56
Discharge: HOME | End: 2021-04-08
Attending: PHYSICIAN ASSISTANT
Payer: COMMERCIAL

## 2021-04-08 DIAGNOSIS — I05.9 MITRAL VALVE DISORDER: ICD-10-CM

## 2021-04-08 PROCEDURE — 93306 TTE W/DOPPLER COMPLETE: CPT | Performed by: PHYSICIAN ASSISTANT

## 2021-04-08 NOTE — TELEPHONE ENCOUNTER
Noted. Ok. If this is better approved through cardiology, then she should see cardiology first. Please inform patient of this. Patient already has this scheduled I believe.

## 2021-04-21 ENCOUNTER — LAB ENCOUNTER (OUTPATIENT)
Dept: LAB | Facility: HOSPITAL | Age: 56
End: 2021-04-21
Attending: PHYSICIAN ASSISTANT
Payer: COMMERCIAL

## 2021-04-21 DIAGNOSIS — Z20.822 EXPOSURE TO COVID-19 VIRUS: ICD-10-CM

## 2021-04-21 PROCEDURE — 36415 COLL VENOUS BLD VENIPUNCTURE: CPT | Performed by: PHYSICIAN ASSISTANT

## 2021-04-21 PROCEDURE — 84443 ASSAY THYROID STIM HORMONE: CPT | Performed by: PHYSICIAN ASSISTANT

## 2021-04-24 ENCOUNTER — LAB ENCOUNTER (OUTPATIENT)
Dept: LAB | Age: 56
End: 2021-04-24
Attending: PHYSICIAN ASSISTANT
Payer: COMMERCIAL

## 2021-04-24 DIAGNOSIS — R00.0 TACHYCARDIA: ICD-10-CM

## 2021-04-24 DIAGNOSIS — R10.11 RUQ PAIN: ICD-10-CM

## 2021-04-24 PROCEDURE — 83690 ASSAY OF LIPASE: CPT

## 2021-04-24 PROCEDURE — 36415 COLL VENOUS BLD VENIPUNCTURE: CPT

## 2021-04-24 PROCEDURE — 82533 TOTAL CORTISOL: CPT

## 2021-04-24 PROCEDURE — 82671 ASSAY OF ESTROGENS: CPT

## 2021-04-24 PROCEDURE — 80053 COMPREHEN METABOLIC PANEL: CPT

## 2021-04-24 PROCEDURE — 85025 COMPLETE CBC W/AUTO DIFF WBC: CPT

## 2021-04-24 PROCEDURE — 80061 LIPID PANEL: CPT

## 2021-06-23 ENCOUNTER — NURSE TRIAGE (OUTPATIENT)
Dept: INTERNAL MEDICINE CLINIC | Facility: CLINIC | Age: 56
End: 2021-06-23

## 2021-06-23 NOTE — TELEPHONE ENCOUNTER
Action Requested: Summary for Provider     []  Critical Lab, Recommendations Needed  [] Need Additional Advice  []   FYI    []   Need Orders  [] Need Medications Sent to Pharmacy  []  Other     SUMMARY: virtual visit scheduled with Dr Craig Mcclure 6/24/21    Pt

## 2021-06-24 ENCOUNTER — TELEMEDICINE (OUTPATIENT)
Dept: FAMILY MEDICINE CLINIC | Facility: CLINIC | Age: 56
End: 2021-06-24

## 2021-06-24 DIAGNOSIS — J02.9 SORE THROAT: Primary | ICD-10-CM

## 2021-06-24 PROCEDURE — 99212 OFFICE O/P EST SF 10 MIN: CPT | Performed by: STUDENT IN AN ORGANIZED HEALTH CARE EDUCATION/TRAINING PROGRAM

## 2021-06-24 NOTE — PROGRESS NOTES
Virtual Telephone Check-In    Tung Bansal verbally consents to a Virtual/Telephone Check-In visit on 06/24/21. Patient has been referred to the [facility] website at [email or web address] to review the yearly Consent to Treat document.     Patient un plan of care above. Coding/billing information is submitted for this visit based on complexity of care and/or time spent for the visit. HPI:    Patient ID: Dinorah Davies is a 63year old female.     HPI  Pt presenting via video visit with sore th Advised to proceed to ER with any significant symptoms  - Instructed to quarantine as discussed for now  - will check rapid strep   Patient reminded to practice good health and safety measures including washing hands, social distancing, covering mouth when

## 2021-06-26 ENCOUNTER — LAB ENCOUNTER (OUTPATIENT)
Dept: LAB | Age: 56
End: 2021-06-26
Attending: STUDENT IN AN ORGANIZED HEALTH CARE EDUCATION/TRAINING PROGRAM
Payer: COMMERCIAL

## 2021-06-26 DIAGNOSIS — J02.9 SORE THROAT: ICD-10-CM

## 2021-06-26 PROCEDURE — 87430 STREP A AG IA: CPT

## 2021-09-23 ENCOUNTER — OFFICE VISIT (OUTPATIENT)
Dept: FAMILY MEDICINE CLINIC | Facility: CLINIC | Age: 56
End: 2021-09-23
Payer: COMMERCIAL

## 2021-09-23 VITALS
WEIGHT: 133 LBS | TEMPERATURE: 98 F | HEIGHT: 63 IN | BODY MASS INDEX: 23.57 KG/M2 | SYSTOLIC BLOOD PRESSURE: 118 MMHG | DIASTOLIC BLOOD PRESSURE: 77 MMHG | HEART RATE: 60 BPM

## 2021-09-23 DIAGNOSIS — R10.11 RUQ PAIN: ICD-10-CM

## 2021-09-23 DIAGNOSIS — Z12.31 ENCOUNTER FOR SCREENING MAMMOGRAM FOR MALIGNANT NEOPLASM OF BREAST: ICD-10-CM

## 2021-09-23 DIAGNOSIS — M54.31 SCIATICA OF RIGHT SIDE: ICD-10-CM

## 2021-09-23 DIAGNOSIS — Z00.00 WELL ADULT EXAM: Primary | ICD-10-CM

## 2021-09-23 DIAGNOSIS — I34.1 MVP (MITRAL VALVE PROLAPSE): ICD-10-CM

## 2021-09-23 DIAGNOSIS — R00.2 PALPITATIONS: ICD-10-CM

## 2021-09-23 DIAGNOSIS — Z12.11 COLON CANCER SCREENING: ICD-10-CM

## 2021-09-23 PROCEDURE — 3074F SYST BP LT 130 MM HG: CPT | Performed by: STUDENT IN AN ORGANIZED HEALTH CARE EDUCATION/TRAINING PROGRAM

## 2021-09-23 PROCEDURE — 3008F BODY MASS INDEX DOCD: CPT | Performed by: STUDENT IN AN ORGANIZED HEALTH CARE EDUCATION/TRAINING PROGRAM

## 2021-09-23 PROCEDURE — 99396 PREV VISIT EST AGE 40-64: CPT | Performed by: STUDENT IN AN ORGANIZED HEALTH CARE EDUCATION/TRAINING PROGRAM

## 2021-09-23 PROCEDURE — 3078F DIAST BP <80 MM HG: CPT | Performed by: STUDENT IN AN ORGANIZED HEALTH CARE EDUCATION/TRAINING PROGRAM

## 2021-09-23 RX ORDER — PREDNISONE 20 MG/1
40 TABLET ORAL DAILY
Qty: 10 TABLET | Refills: 0 | Status: SHIPPED | OUTPATIENT
Start: 2021-09-23 | End: 2021-09-28

## 2021-09-23 NOTE — PROGRESS NOTES
HPI:    Patient ID: Sal Sheppard is a 63year old female. HPI  Pt presenting for routine physical exam. Denies any recent illnesses. No significant chronic medical problems.  Past medical/surgical history, family history, and social history were distress. Appearance: Normal appearance. She is well-developed. HENT:      Head: Normocephalic and atraumatic.       Right Ear: Tympanic membrane, ear canal and external ear normal.      Left Ear: Tympanic membrane, ear canal and external ear normal. with eye doctor annually  - follow-up with dentist every 6 months  - return yearly for physicals  - annual flu shot  - TSH W REFLEX TO FREE T4; Future  - CBC, PLATELET; NO DIFFERENTIAL; Future  - COMP METABOLIC PANEL (14);  Future  - HEMOGLOBIN A1C; Future

## 2021-09-25 ENCOUNTER — LAB ENCOUNTER (OUTPATIENT)
Dept: LAB | Age: 56
End: 2021-09-25
Attending: STUDENT IN AN ORGANIZED HEALTH CARE EDUCATION/TRAINING PROGRAM
Payer: COMMERCIAL

## 2021-09-25 DIAGNOSIS — Z00.00 WELL ADULT EXAM: ICD-10-CM

## 2021-09-25 LAB
ALBUMIN SERPL-MCNC: 3.5 G/DL (ref 3.4–5)
ALBUMIN/GLOB SERPL: 0.9 {RATIO} (ref 1–2)
ALP LIVER SERPL-CCNC: 98 U/L
ALT SERPL-CCNC: 20 U/L
ANION GAP SERPL CALC-SCNC: 2 MMOL/L (ref 0–18)
AST SERPL-CCNC: 21 U/L (ref 15–37)
BILIRUB SERPL-MCNC: 0.7 MG/DL (ref 0.1–2)
BUN BLD-MCNC: 17 MG/DL (ref 7–18)
BUN/CREAT SERPL: 28.3 (ref 10–20)
CALCIUM BLD-MCNC: 9 MG/DL (ref 8.5–10.1)
CHLORIDE SERPL-SCNC: 110 MMOL/L (ref 98–112)
CHOLEST SERPL-MCNC: 181 MG/DL (ref ?–200)
CO2 SERPL-SCNC: 29 MMOL/L (ref 21–32)
CREAT BLD-MCNC: 0.6 MG/DL
DEPRECATED RDW RBC AUTO: 44.5 FL (ref 35.1–46.3)
ERYTHROCYTE [DISTWIDTH] IN BLOOD BY AUTOMATED COUNT: 12.2 % (ref 11–15)
EST. AVERAGE GLUCOSE BLD GHB EST-MCNC: 111 MG/DL (ref 68–126)
GLOBULIN PLAS-MCNC: 3.7 G/DL (ref 2.8–4.4)
GLUCOSE BLD-MCNC: 79 MG/DL (ref 70–99)
HBA1C MFR BLD HPLC: 5.5 % (ref ?–5.7)
HCT VFR BLD AUTO: 43.8 %
HDLC SERPL-MCNC: 53 MG/DL (ref 40–59)
HGB BLD-MCNC: 13.9 G/DL
LDLC SERPL CALC-MCNC: 115 MG/DL (ref ?–100)
MCH RBC QN AUTO: 31.4 PG (ref 26–34)
MCHC RBC AUTO-ENTMCNC: 31.7 G/DL (ref 31–37)
MCV RBC AUTO: 99.1 FL
NONHDLC SERPL-MCNC: 128 MG/DL (ref ?–130)
OSMOLALITY SERPL CALC.SUM OF ELEC: 292 MOSM/KG (ref 275–295)
PATIENT FASTING Y/N/NP: YES
PATIENT FASTING Y/N/NP: YES
PLATELET # BLD AUTO: 200 10(3)UL (ref 150–450)
POTASSIUM SERPL-SCNC: 3.9 MMOL/L (ref 3.5–5.1)
PROT SERPL-MCNC: 7.2 G/DL (ref 6.4–8.2)
RBC # BLD AUTO: 4.42 X10(6)UL
SODIUM SERPL-SCNC: 141 MMOL/L (ref 136–145)
TRIGL SERPL-MCNC: 71 MG/DL (ref 30–149)
TSI SER-ACNC: 2.99 MIU/ML (ref 0.36–3.74)
VIT D+METAB SERPL-MCNC: 22.9 NG/ML (ref 30–100)
VLDLC SERPL CALC-MCNC: 12 MG/DL (ref 0–30)
WBC # BLD AUTO: 4.7 X10(3) UL (ref 4–11)

## 2021-09-25 PROCEDURE — 36415 COLL VENOUS BLD VENIPUNCTURE: CPT

## 2021-09-25 PROCEDURE — 83036 HEMOGLOBIN GLYCOSYLATED A1C: CPT

## 2021-09-25 PROCEDURE — 85027 COMPLETE CBC AUTOMATED: CPT

## 2021-09-25 PROCEDURE — 80053 COMPREHEN METABOLIC PANEL: CPT

## 2021-09-25 PROCEDURE — 82306 VITAMIN D 25 HYDROXY: CPT

## 2021-09-25 PROCEDURE — 80061 LIPID PANEL: CPT

## 2021-09-25 PROCEDURE — 84443 ASSAY THYROID STIM HORMONE: CPT

## 2021-10-14 ENCOUNTER — TELEPHONE (OUTPATIENT)
Dept: FAMILY MEDICINE CLINIC | Facility: CLINIC | Age: 56
End: 2021-10-14

## 2021-10-14 ENCOUNTER — MED REC SCAN ONLY (OUTPATIENT)
Dept: FAMILY MEDICINE CLINIC | Facility: CLINIC | Age: 56
End: 2021-10-14

## 2021-10-14 NOTE — TELEPHONE ENCOUNTER
OV notes from 9/23/2021. EKG reports and tracings from 4/1/2021 and 12/5/2020; ECHO report successfully faxed to Dr Brian Jensen. Confirmation # placed in scanning. Called pt to notify. Pt verbalized understanding.

## 2021-10-14 NOTE — TELEPHONE ENCOUNTER
Patient is calling to request the following the following documents be faxed to her cardiologist , Dr. Sandra Barrios, per referral from PCP.     Last office visit  Referral to Cardiologist  Latest EKG/Echo    [phone]  Attn:  Dr. Sandra Barrios    Patient has appointment appointment at 10:30 AM

## 2021-11-26 ENCOUNTER — TELEMEDICINE (OUTPATIENT)
Dept: FAMILY MEDICINE CLINIC | Facility: CLINIC | Age: 56
End: 2021-11-26

## 2021-11-26 DIAGNOSIS — R11.2 NAUSEA VOMITING AND DIARRHEA: Primary | ICD-10-CM

## 2021-11-26 DIAGNOSIS — R19.7 NAUSEA VOMITING AND DIARRHEA: Primary | ICD-10-CM

## 2021-11-26 PROCEDURE — 99213 OFFICE O/P EST LOW 20 MIN: CPT | Performed by: STUDENT IN AN ORGANIZED HEALTH CARE EDUCATION/TRAINING PROGRAM

## 2021-11-26 RX ORDER — LOPERAMIDE HYDROCHLORIDE 2 MG/1
2 CAPSULE ORAL 4 TIMES DAILY PRN
Qty: 30 CAPSULE | Refills: 1 | Status: SHIPPED | OUTPATIENT
Start: 2021-11-26

## 2021-11-26 RX ORDER — ONDANSETRON HYDROCHLORIDE 8 MG/1
8 TABLET, FILM COATED ORAL EVERY 8 HOURS PRN
Qty: 30 TABLET | Refills: 0 | Status: SHIPPED | OUTPATIENT
Start: 2021-11-26 | End: 2022-01-13

## 2021-11-26 NOTE — PROGRESS NOTES
Virtual Telephone Check-In    Dinorah Davies verbally consents to a Virtual/Telephone Check-In visit on 11/26/21. Patient has been referred to the [facility] website at [email or web address] to review the yearly Consent to Treat document.     Patient un plan of care above. Coding/billing information is submitted for this visit based on complexity of care and/or time spent for the visit. HPI:    Patient ID: Daniel Hatch is a 61year old female.     HPI  Pt presenting via video visit with abd c Instructed to quarantine as discussed for now  - Imodium PRN  - Zofran PRN  Patient reminded to practice good health and safety measures including washing hands, social distancing, covering mouth when coughing/sneezing, and avoiding social meetings and gat

## 2021-11-27 ENCOUNTER — TELEPHONE (OUTPATIENT)
Dept: FAMILY MEDICINE CLINIC | Facility: CLINIC | Age: 56
End: 2021-11-27

## 2021-11-27 ENCOUNTER — LAB ENCOUNTER (OUTPATIENT)
Dept: LAB | Age: 56
End: 2021-11-27
Attending: STUDENT IN AN ORGANIZED HEALTH CARE EDUCATION/TRAINING PROGRAM
Payer: COMMERCIAL

## 2021-11-27 ENCOUNTER — HOSPITAL ENCOUNTER (OUTPATIENT)
Dept: MAMMOGRAPHY | Age: 56
Discharge: HOME | End: 2021-11-27
Attending: STUDENT IN AN ORGANIZED HEALTH CARE EDUCATION/TRAINING PROGRAM
Payer: COMMERCIAL

## 2021-11-27 DIAGNOSIS — R11.2 NAUSEA VOMITING AND DIARRHEA: ICD-10-CM

## 2021-11-27 DIAGNOSIS — Z12.31 ENCOUNTER FOR SCREENING MAMMOGRAM FOR MALIGNANT NEOPLASM OF BREAST: ICD-10-CM

## 2021-11-27 DIAGNOSIS — R19.7 NAUSEA VOMITING AND DIARRHEA: ICD-10-CM

## 2021-11-27 PROCEDURE — 77067 SCR MAMMO BI INCL CAD: CPT | Performed by: STUDENT IN AN ORGANIZED HEALTH CARE EDUCATION/TRAINING PROGRAM

## 2021-11-27 PROCEDURE — 77063 BREAST TOMOSYNTHESIS BI: CPT | Performed by: STUDENT IN AN ORGANIZED HEALTH CARE EDUCATION/TRAINING PROGRAM

## 2021-12-16 ENCOUNTER — LAB ENCOUNTER (OUTPATIENT)
Dept: LAB | Facility: HOSPITAL | Age: 56
End: 2021-12-16
Attending: INTERNAL MEDICINE
Payer: COMMERCIAL

## 2021-12-16 ENCOUNTER — OFFICE VISIT (OUTPATIENT)
Dept: GASTROENTEROLOGY | Facility: CLINIC | Age: 56
End: 2021-12-16
Payer: COMMERCIAL

## 2021-12-16 VITALS
HEIGHT: 63 IN | SYSTOLIC BLOOD PRESSURE: 119 MMHG | WEIGHT: 134.38 LBS | HEART RATE: 46 BPM | DIASTOLIC BLOOD PRESSURE: 71 MMHG | BODY MASS INDEX: 23.81 KG/M2

## 2021-12-16 DIAGNOSIS — R10.11 ABDOMINAL PAIN, RIGHT UPPER QUADRANT: ICD-10-CM

## 2021-12-16 DIAGNOSIS — R10.11 ABDOMINAL PAIN, RIGHT UPPER QUADRANT: Primary | ICD-10-CM

## 2021-12-16 PROCEDURE — 82784 ASSAY IGA/IGD/IGG/IGM EACH: CPT

## 2021-12-16 PROCEDURE — 99243 OFF/OP CNSLTJ NEW/EST LOW 30: CPT | Performed by: INTERNAL MEDICINE

## 2021-12-16 PROCEDURE — 3078F DIAST BP <80 MM HG: CPT | Performed by: INTERNAL MEDICINE

## 2021-12-16 PROCEDURE — 36415 COLL VENOUS BLD VENIPUNCTURE: CPT

## 2021-12-16 PROCEDURE — 83516 IMMUNOASSAY NONANTIBODY: CPT

## 2021-12-16 PROCEDURE — 3008F BODY MASS INDEX DOCD: CPT | Performed by: INTERNAL MEDICINE

## 2021-12-16 PROCEDURE — 3074F SYST BP LT 130 MM HG: CPT | Performed by: INTERNAL MEDICINE

## 2021-12-16 NOTE — PATIENT INSTRUCTIONS
RUQ pain   - trial of FODMAP diet  - probiotic - Florastor 1 pill twice a day  - blood test for celiac  - EGD   - consider antacid therapy    Colon cancer screening  - colonoscopy with Miralax prep and MAC

## 2021-12-16 NOTE — PROGRESS NOTES
Sushila Constantino is a 61year old female.     HPI:   Patient presents with:  Consult: Abdominal pain; pressure  Colonoscopy Screening  Constipation  Diarrhea    The patient is a 61year old female with a history of migraine headaches, mitral valve prola since quittin.0      Smokeless tobacco: Never Used      Tobacco comment: socially only    Vaping Use      Vaping Use: Never used    Alcohol use:  Yes      Alcohol/week: 0.0 standard drinks      Comment: 1-2 drinks/week    Drug use: No       Medications will add an upper endoscopy to evaluate for luminal causes such as outlined above. Risks and benefits of the procedure outlined process of bowel preparation was discussed and the procedure process was reviewed. She is agreeable to proceeding.     Plan  RU

## 2021-12-18 ENCOUNTER — HOSPITAL ENCOUNTER (OUTPATIENT)
Dept: CT IMAGING | Facility: HOSPITAL | Age: 56
Discharge: HOME | End: 2021-12-18
Attending: INTERNAL MEDICINE

## 2021-12-18 DIAGNOSIS — Z13.6 SCREENING FOR CARDIOVASCULAR CONDITION: ICD-10-CM

## 2022-01-09 ENCOUNTER — IMMUNIZATION (OUTPATIENT)
Dept: LAB | Facility: HOSPITAL | Age: 57
End: 2022-01-09
Attending: EMERGENCY MEDICINE
Payer: COMMERCIAL

## 2022-01-09 DIAGNOSIS — Z23 NEED FOR VACCINATION: Primary | ICD-10-CM

## 2022-01-09 PROCEDURE — 0001A SARSCOV2 VAC 30MCG/0.3ML IM: CPT

## 2022-01-09 PROCEDURE — 0051A SARSCOV2 VAC 30MCG/0.3ML IM: CPT

## 2022-01-12 ENCOUNTER — TELEPHONE (OUTPATIENT)
Dept: FAMILY MEDICINE CLINIC | Facility: CLINIC | Age: 57
End: 2022-01-12

## 2022-01-13 DIAGNOSIS — R11.2 NAUSEA VOMITING AND DIARRHEA: ICD-10-CM

## 2022-01-13 DIAGNOSIS — R19.7 NAUSEA VOMITING AND DIARRHEA: ICD-10-CM

## 2022-01-13 RX ORDER — ONDANSETRON HYDROCHLORIDE 8 MG/1
8 TABLET, FILM COATED ORAL EVERY 8 HOURS PRN
Qty: 30 TABLET | Refills: 0 | Status: SHIPPED | OUTPATIENT
Start: 2022-01-13

## 2022-01-13 NOTE — TELEPHONE ENCOUNTER
----- Message from Brandi Loera RN sent at 1/12/2022  9:15 PM CST -----  Regarding: FW: Headache post 1st Covid Vaccine      ----- Message -----  From: Mela Knutson  Sent: 1/12/2022   8:27 PM CST  To: Em Rn Triage  Subject: Headache post 1st Co

## 2022-01-13 NOTE — TELEPHONE ENCOUNTER
Refill passed per [address] [place] protocol. Requested Prescriptions   Pending Prescriptions Disp Refills    ondansetron (ZOFRAN) 8 MG tablet 30 tablet 0     Sig: Take 1 tablet (8 mg total) by mouth every 8 (eight) hours as needed for Nausea.         Adam Poon

## 2022-01-13 NOTE — TELEPHONE ENCOUNTER
Patient calling, confirmed name and . Requesting advice about headaches after having a severe headache after her Covid vaccine on . States that her schedule makes OV difficult.  Video Visit scheduled:    Future Appointments   Date Time Provider

## 2022-01-13 NOTE — TELEPHONE ENCOUNTER
Patient calling, confirmed name and . Requesting refill for Zofran.     Future Appointments   Date Time Provider Ana Rosa Blanca   2022  1:15 PM Jayro Linn MD [facility]   2022 10:20 AM Julia

## 2022-01-14 ENCOUNTER — TELEPHONE (OUTPATIENT)
Dept: GASTROENTEROLOGY | Facility: CLINIC | Age: 57
End: 2022-01-14

## 2022-01-14 DIAGNOSIS — Z12.11 COLON CANCER SCREENING: Primary | ICD-10-CM

## 2022-01-14 DIAGNOSIS — R10.11 RUQ PAIN: ICD-10-CM

## 2022-01-14 NOTE — TELEPHONE ENCOUNTER
Scheduled for:  Colonoscopy [phone] and EGD [id number]  Provider Name:  Dr. Ramila Knox  Date:  2/4/22  Location:    [facility]  Sedation:  MAC  Time:  2728 (pt is aware to arrive at 1130)   Prep:  Miralax/Gatorade, sent via [employer] on 1/14/22  Meds/Allergies Reconciled?:  P

## 2022-01-19 ENCOUNTER — TELEMEDICINE (OUTPATIENT)
Dept: FAMILY MEDICINE CLINIC | Facility: CLINIC | Age: 57
End: 2022-01-19

## 2022-01-19 ENCOUNTER — TELEPHONE (OUTPATIENT)
Dept: FAMILY MEDICINE CLINIC | Facility: CLINIC | Age: 57
End: 2022-01-19

## 2022-01-19 DIAGNOSIS — Z71.9 VISIT FOR COUNSELING: Primary | ICD-10-CM

## 2022-01-30 ENCOUNTER — IMMUNIZATION (OUTPATIENT)
Dept: LAB | Facility: HOSPITAL | Age: 57
End: 2022-01-30
Attending: EMERGENCY MEDICINE
Payer: COMMERCIAL

## 2022-01-30 DIAGNOSIS — Z23 NEED FOR VACCINATION: Primary | ICD-10-CM

## 2022-01-30 PROCEDURE — 0052A SARSCOV2 VAC 30MCG TRS SUCR: CPT

## 2022-01-31 ENCOUNTER — TELEPHONE (OUTPATIENT)
Dept: GASTROENTEROLOGY | Facility: CLINIC | Age: 57
End: 2022-01-31

## 2022-01-31 NOTE — TELEPHONE ENCOUNTER
Per Endo PAT/RN--    Patient states shes getting 2nd covid vaccination day before and does not want to go through prep as well as last time she had many side affects post 1st vaccine. She will call office to reschedule procedure.     Cancelled for:  [employer]

## 2022-03-21 NOTE — LETTER
9/16/2019          To Whom It May Concern:    Zohaib Alonso is currently under my medical care and may not return to work at this time. Please excuse Alejandrina Byrne for 1 days. She may return to work on 9/17/2019.   Activity is restricted as follows: no li
Wound Check/Suture Removal

## 2022-03-23 ENCOUNTER — PATIENT MESSAGE (OUTPATIENT)
Dept: ORTHOPEDICS CLINIC | Facility: CLINIC | Age: 57
End: 2022-03-23

## 2022-03-23 ENCOUNTER — E-VISIT (OUTPATIENT)
Dept: TELEHEALTH | Age: 57
End: 2022-03-23

## 2022-03-23 ENCOUNTER — HOSPITAL ENCOUNTER (OUTPATIENT)
Age: 57
Discharge: HOME | End: 2022-03-23
Payer: COMMERCIAL

## 2022-03-23 ENCOUNTER — OFFICE VISIT (OUTPATIENT)
Dept: RHEUMATOLOGY | Facility: CLINIC | Age: 57
End: 2022-03-23
Payer: COMMERCIAL

## 2022-03-23 VITALS
BODY MASS INDEX: 24.27 KG/M2 | WEIGHT: 137 LBS | DIASTOLIC BLOOD PRESSURE: 79 MMHG | SYSTOLIC BLOOD PRESSURE: 116 MMHG | HEART RATE: 61 BPM | HEIGHT: 63 IN | RESPIRATION RATE: 16 BRPM

## 2022-03-23 VITALS
DIASTOLIC BLOOD PRESSURE: 72 MMHG | SYSTOLIC BLOOD PRESSURE: 140 MMHG | OXYGEN SATURATION: 100 % | RESPIRATION RATE: 16 BRPM | HEART RATE: 60 BPM | TEMPERATURE: 99 F

## 2022-03-23 DIAGNOSIS — R76.8 POSITIVE ANA (ANTINUCLEAR ANTIBODY): Primary | ICD-10-CM

## 2022-03-23 DIAGNOSIS — M25.50 POLYARTHRALGIA: ICD-10-CM

## 2022-03-23 DIAGNOSIS — Z02.9 ADMINISTRATIVE ENCOUNTER: Primary | ICD-10-CM

## 2022-03-23 DIAGNOSIS — M54.12 CERVICAL RADICULOPATHY: ICD-10-CM

## 2022-03-23 DIAGNOSIS — M54.12 CERVICAL RADICULOPATHY: Primary | ICD-10-CM

## 2022-03-23 PROCEDURE — 3008F BODY MASS INDEX DOCD: CPT | Performed by: INTERNAL MEDICINE

## 2022-03-23 PROCEDURE — 99213 OFFICE O/P EST LOW 20 MIN: CPT

## 2022-03-23 PROCEDURE — 99214 OFFICE O/P EST MOD 30 MIN: CPT

## 2022-03-23 PROCEDURE — 3078F DIAST BP <80 MM HG: CPT | Performed by: INTERNAL MEDICINE

## 2022-03-23 PROCEDURE — 99214 OFFICE O/P EST MOD 30 MIN: CPT | Performed by: INTERNAL MEDICINE

## 2022-03-23 PROCEDURE — 3074F SYST BP LT 130 MM HG: CPT | Performed by: INTERNAL MEDICINE

## 2022-03-23 RX ORDER — METHYLPREDNISOLONE 4 MG/1
TABLET ORAL
Qty: 1 EACH | Refills: 0 | Status: SHIPPED | OUTPATIENT
Start: 2022-03-23

## 2022-03-23 RX ORDER — LIDOCAINE 50 MG/G
1 PATCH TOPICAL EVERY 24 HOURS
Qty: 7 PATCH | Refills: 0 | Status: SHIPPED | OUTPATIENT
Start: 2022-03-23 | End: 2022-03-30

## 2022-03-23 RX ORDER — HYDROXYCHLOROQUINE SULFATE 200 MG/1
400 TABLET, FILM COATED ORAL DAILY
Qty: 60 TABLET | Refills: 1 | Status: SHIPPED | OUTPATIENT
Start: 2022-03-23

## 2022-03-23 NOTE — ED INITIAL ASSESSMENT (HPI)
Pt denies precipitating event, just woke up with back tightness/pain 3 days ago. Denies injury or accident.

## 2022-03-23 NOTE — ED INITIAL ASSESSMENT (HPI)
Pt presents with back pain, muscle tightness and spasms in thoracic and cervical spine x3 days. Mild headache. Reports intermittent tingling sensation in hands and upper R arm. Patient denies changes in bowel or bladder, changes in sensation, or weakness. She reports seeing a Rheumatologist for some autoimmune issues, scheduled to see Rheum  at 64 today.

## 2022-03-23 NOTE — TELEPHONE ENCOUNTER
From: Angie Arrington  To: Nydia Crump MD  Sent: 3/23/2022 8:30 AM CDT  Subject: Neck Stiffness, Tingling in both hands, headache    I continue to have issues with mobility in my neck and have had a headache and tingling in both hands for last 3 days. 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 I scheduled an appointment to see you tomorrow, your earliest.    If you have an opening today can I be outreached to please. If not can a letter be provided to my employer pending visit, please excuse attendance until seen? I appreciate your review.

## 2022-03-23 NOTE — TELEPHONE ENCOUNTER
S/w pt and she states she has had a reoccurring issue with her neck for the past year but it has felt tight for the past 3 days, tingling in both hands and headaches. She saw Dr. Tigist Macias in 2016 for issue. Advised her that Dr. Belle Webb has never evaluated her for this issue before, last seen in 06/2020 for left knee only. I advised that she reach out to her pcp for evaluation and recommendations and if needed work note. As Dr. Belle Webb would not be able to provide a work note if pt has not been evaluated for issue before. Pt confirmed she will call pcp to f/u.

## 2022-03-23 NOTE — PROGRESS NOTES
Patient was referred to the [facility] for recurrent cervical pain with tingling to both arms. Patient presented to the [facility] at this time. E-visit cancelled with no charge.

## 2022-03-25 ENCOUNTER — TELEPHONE (OUTPATIENT)
Dept: FAMILY MEDICINE CLINIC | Facility: CLINIC | Age: 57
End: 2022-03-25

## 2022-04-26 ENCOUNTER — PATIENT MESSAGE (OUTPATIENT)
Dept: FAMILY MEDICINE CLINIC | Facility: CLINIC | Age: 57
End: 2022-04-26

## 2022-04-26 NOTE — TELEPHONE ENCOUNTER
From: Viola Fitch  To: Donnie Guzman MD  Sent: 4/26/2022 7:33 AM CDT  Subject: Lab Request    I would like to have an order for mineral & vitamin panel test. I am scheduled to complete labs for Dr Sourav Guzmán and would like to do all labs at 1 lab visit. Thank you.     Viola Fitch

## 2022-05-20 ENCOUNTER — LAB ENCOUNTER (OUTPATIENT)
Dept: LAB | Age: 57
End: 2022-05-20
Attending: NURSE PRACTITIONER
Payer: COMMERCIAL

## 2022-05-20 ENCOUNTER — HOSPITAL ENCOUNTER (OUTPATIENT)
Age: 57
Discharge: HOME | End: 2022-05-20
Payer: COMMERCIAL

## 2022-05-20 ENCOUNTER — APPOINTMENT (OUTPATIENT)
Dept: GENERAL RADIOLOGY | Age: 57
End: 2022-05-20
Attending: NURSE PRACTITIONER
Payer: COMMERCIAL

## 2022-05-20 ENCOUNTER — PATIENT MESSAGE (OUTPATIENT)
Dept: FAMILY MEDICINE CLINIC | Facility: CLINIC | Age: 57
End: 2022-05-20

## 2022-05-20 VITALS
OXYGEN SATURATION: 99 % | HEART RATE: 72 BPM | RESPIRATION RATE: 16 BRPM | DIASTOLIC BLOOD PRESSURE: 69 MMHG | TEMPERATURE: 99 F | SYSTOLIC BLOOD PRESSURE: 129 MMHG

## 2022-05-20 DIAGNOSIS — S63.502A SPRAIN OF LEFT WRIST, INITIAL ENCOUNTER: ICD-10-CM

## 2022-05-20 DIAGNOSIS — R76.8 POSITIVE ANA (ANTINUCLEAR ANTIBODY): ICD-10-CM

## 2022-05-20 DIAGNOSIS — R53.83 OTHER FATIGUE: ICD-10-CM

## 2022-05-20 DIAGNOSIS — M25.50 POLYARTHRALGIA: Primary | ICD-10-CM

## 2022-05-20 DIAGNOSIS — M25.532 WRIST PAIN, ACUTE, LEFT: Primary | ICD-10-CM

## 2022-05-20 DIAGNOSIS — E55.9 VITAMIN D DEFICIENCY: ICD-10-CM

## 2022-05-20 LAB
ALBUMIN SERPL-MCNC: 3.7 G/DL (ref 3.4–5)
ALBUMIN/GLOB SERPL: 1 {RATIO} (ref 1–2)
ALP LIVER SERPL-CCNC: 93 U/L
ALT SERPL-CCNC: 20 U/L
ANION GAP SERPL CALC-SCNC: 6 MMOL/L (ref 0–18)
AST SERPL-CCNC: 23 U/L (ref 15–37)
BILIRUB SERPL-MCNC: 1.1 MG/DL (ref 0.1–2)
BUN BLD-MCNC: 11 MG/DL (ref 7–18)
BUN/CREAT SERPL: 14.9 (ref 10–20)
CALCIUM BLD-MCNC: 10.1 MG/DL (ref 8.5–10.1)
CHLORIDE SERPL-SCNC: 107 MMOL/L (ref 98–112)
CO2 SERPL-SCNC: 28 MMOL/L (ref 21–32)
CREAT BLD-MCNC: 0.74 MG/DL
CRP SERPL-MCNC: <0.29 MG/DL (ref ?–0.3)
DEPRECATED RDW RBC AUTO: 46.3 FL (ref 35.1–46.3)
ERYTHROCYTE [DISTWIDTH] IN BLOOD BY AUTOMATED COUNT: 12.5 % (ref 11–15)
ERYTHROCYTE [SEDIMENTATION RATE] IN BLOOD: 8 MM/HR
FASTING STATUS PATIENT QL REPORTED: NO
GLOBULIN PLAS-MCNC: 3.7 G/DL (ref 2.8–4.4)
GLUCOSE BLD-MCNC: 91 MG/DL (ref 70–99)
HCT VFR BLD AUTO: 47.2 %
HGB BLD-MCNC: 14.6 G/DL
MCH RBC QN AUTO: 30.8 PG (ref 26–34)
MCHC RBC AUTO-ENTMCNC: 30.9 G/DL (ref 31–37)
MCV RBC AUTO: 99.6 FL
OSMOLALITY SERPL CALC.SUM OF ELEC: 291 MOSM/KG (ref 275–295)
PLATELET # BLD AUTO: 219 10(3)UL (ref 150–450)
POTASSIUM SERPL-SCNC: 4.3 MMOL/L (ref 3.5–5.1)
PROT SERPL-MCNC: 7.4 G/DL (ref 6.4–8.2)
RBC # BLD AUTO: 4.74 X10(6)UL
RHEUMATOID FACT SERPL-ACNC: <10 IU/ML (ref ?–15)
SODIUM SERPL-SCNC: 141 MMOL/L (ref 136–145)
VIT B12 SERPL-MCNC: 471 PG/ML (ref 193–986)
VIT D+METAB SERPL-MCNC: 30.5 NG/ML (ref 30–100)
WBC # BLD AUTO: 6.3 X10(3) UL (ref 4–11)

## 2022-05-20 PROCEDURE — 80053 COMPREHEN METABOLIC PANEL: CPT | Performed by: INTERNAL MEDICINE

## 2022-05-20 PROCEDURE — 99213 OFFICE O/P EST LOW 20 MIN: CPT

## 2022-05-20 PROCEDURE — 86235 NUCLEAR ANTIGEN ANTIBODY: CPT | Performed by: INTERNAL MEDICINE

## 2022-05-20 PROCEDURE — 73110 X-RAY EXAM OF WRIST: CPT | Performed by: NURSE PRACTITIONER

## 2022-05-20 PROCEDURE — 85027 COMPLETE CBC AUTOMATED: CPT | Performed by: INTERNAL MEDICINE

## 2022-05-20 PROCEDURE — 82306 VITAMIN D 25 HYDROXY: CPT

## 2022-05-20 PROCEDURE — 86038 ANTINUCLEAR ANTIBODIES: CPT

## 2022-05-20 PROCEDURE — 86225 DNA ANTIBODY NATIVE: CPT

## 2022-05-20 PROCEDURE — 86235 NUCLEAR ANTIGEN ANTIBODY: CPT

## 2022-05-20 PROCEDURE — 82607 VITAMIN B-12: CPT | Performed by: INTERNAL MEDICINE

## 2022-05-20 PROCEDURE — 86039 ANTINUCLEAR ANTIBODIES (ANA): CPT

## 2022-05-20 PROCEDURE — 86200 CCP ANTIBODY: CPT | Performed by: INTERNAL MEDICINE

## 2022-05-20 PROCEDURE — 85652 RBC SED RATE AUTOMATED: CPT

## 2022-05-20 PROCEDURE — 84425 ASSAY OF VITAMIN B-1: CPT

## 2022-05-20 PROCEDURE — 36415 COLL VENOUS BLD VENIPUNCTURE: CPT

## 2022-05-20 PROCEDURE — 86140 C-REACTIVE PROTEIN: CPT | Performed by: INTERNAL MEDICINE

## 2022-05-20 PROCEDURE — 86431 RHEUMATOID FACTOR QUANT: CPT | Performed by: INTERNAL MEDICINE

## 2022-05-20 NOTE — ED INITIAL ASSESSMENT (HPI)
Pt comes in for eval of L wrist. Reports that she fractured her wrist in 2019, and on Sunday she was setting up a tent and started have sharp severe pain in the same location. Pain has persisted the last several days and she is having difficulty with daily tasks that require weight bearing or twisting. Sensation and circulation intact. No obvious deformity.

## 2022-05-21 ENCOUNTER — TELEPHONE (OUTPATIENT)
Dept: FAMILY MEDICINE CLINIC | Facility: CLINIC | Age: 57
End: 2022-05-21

## 2022-05-21 NOTE — TELEPHONE ENCOUNTER
Patient calling needs her return to  work letter amended to state she  cannot lift more than 5 lbs for the next week 5/21 thru 5//28 was seen at [facility] in [facility] on 5/20  She has a wrist injury       [facility]    Routing to Dr. Deborah Moran  as on call    Please advise and thank you.

## 2022-05-23 LAB
CCP IGG SERPL-ACNC: 2.1 U/ML (ref 0–6.9)
NUCLEAR IGG TITR SER IF: POSITIVE {TITER}

## 2022-05-24 LAB — DSDNA AB TITR SER: <10 {TITER}

## 2022-05-25 LAB
ANA NUCLEOLAR TITR SER IF: 640 {TITER}
VITAMIN B1 (THIAMINE), WHOLE B: 162 NMOL/L

## 2022-05-26 LAB
ENA SM IGG SER QL: NEGATIVE
ENA SS-A AB SER QL IA: NEGATIVE
ENA SS-B AB SER QL IA: NEGATIVE

## 2022-05-26 RX ORDER — MAGNESIUM MALATE 16.2 %
POWDER (GRAM) MISCELLANEOUS
COMMUNITY
Start: 2022-03-16

## 2022-05-26 RX ORDER — BIOTIN 1 MG
TABLET ORAL
COMMUNITY
Start: 2022-03-16

## 2022-06-02 ENCOUNTER — OFFICE VISIT (OUTPATIENT)
Dept: FAMILY MEDICINE CLINIC | Facility: CLINIC | Age: 57
End: 2022-06-02
Payer: COMMERCIAL

## 2022-06-02 ENCOUNTER — PATIENT MESSAGE (OUTPATIENT)
Dept: FAMILY MEDICINE CLINIC | Facility: CLINIC | Age: 57
End: 2022-06-02

## 2022-06-02 VITALS
HEART RATE: 51 BPM | WEIGHT: 140 LBS | RESPIRATION RATE: 16 BRPM | DIASTOLIC BLOOD PRESSURE: 86 MMHG | HEIGHT: 63 IN | BODY MASS INDEX: 24.8 KG/M2 | SYSTOLIC BLOOD PRESSURE: 128 MMHG

## 2022-06-02 DIAGNOSIS — L71.9 ROSACEA: Primary | ICD-10-CM

## 2022-06-02 DIAGNOSIS — R63.5 WEIGHT GAIN: ICD-10-CM

## 2022-06-02 DIAGNOSIS — M19.90 ARTHRITIS: ICD-10-CM

## 2022-06-02 PROCEDURE — 3079F DIAST BP 80-89 MM HG: CPT | Performed by: STUDENT IN AN ORGANIZED HEALTH CARE EDUCATION/TRAINING PROGRAM

## 2022-06-02 PROCEDURE — 3008F BODY MASS INDEX DOCD: CPT | Performed by: STUDENT IN AN ORGANIZED HEALTH CARE EDUCATION/TRAINING PROGRAM

## 2022-06-02 PROCEDURE — 3074F SYST BP LT 130 MM HG: CPT | Performed by: STUDENT IN AN ORGANIZED HEALTH CARE EDUCATION/TRAINING PROGRAM

## 2022-06-02 PROCEDURE — 99213 OFFICE O/P EST LOW 20 MIN: CPT | Performed by: STUDENT IN AN ORGANIZED HEALTH CARE EDUCATION/TRAINING PROGRAM

## 2022-06-02 RX ORDER — METRONIDAZOLE 10 MG/G
1 GEL TOPICAL DAILY
Qty: 60 G | Refills: 0 | Status: SHIPPED | OUTPATIENT
Start: 2022-06-02 | End: 2022-09-30

## 2022-06-04 RX ORDER — METRONIDAZOLE 7.5 MG/G
1 GEL TOPICAL 2 TIMES DAILY
Qty: 45 G | Refills: 0 | Status: SHIPPED | OUTPATIENT
Start: 2022-06-04

## 2022-08-03 ENCOUNTER — OFFICE VISIT (OUTPATIENT)
Dept: FAMILY MEDICINE CLINIC | Facility: CLINIC | Age: 57
End: 2022-08-03
Payer: COMMERCIAL

## 2022-08-03 VITALS
HEART RATE: 52 BPM | RESPIRATION RATE: 16 BRPM | WEIGHT: 138 LBS | SYSTOLIC BLOOD PRESSURE: 112 MMHG | BODY MASS INDEX: 24.45 KG/M2 | TEMPERATURE: 98 F | OXYGEN SATURATION: 97 % | DIASTOLIC BLOOD PRESSURE: 70 MMHG | HEIGHT: 63 IN

## 2022-08-03 DIAGNOSIS — J06.9 VIRAL URI: Primary | ICD-10-CM

## 2022-08-03 DIAGNOSIS — J02.9 SORE THROAT: ICD-10-CM

## 2022-08-03 LAB
CONTROL LINE PRESENT WITH A CLEAR BACKGROUND (YES/NO): YES YES/NO
KIT LOT #: NORMAL NUMERIC
OPERATOR ID: NORMAL
POCT LOT NUMBER: NORMAL
RAPID SARS-COV-2 BY PCR: NOT DETECTED
STREP GRP A CUL-SCR: NEGATIVE

## 2022-08-03 PROCEDURE — 3008F BODY MASS INDEX DOCD: CPT | Performed by: NURSE PRACTITIONER

## 2022-08-03 PROCEDURE — 99213 OFFICE O/P EST LOW 20 MIN: CPT | Performed by: NURSE PRACTITIONER

## 2022-08-03 PROCEDURE — 87880 STREP A ASSAY W/OPTIC: CPT | Performed by: NURSE PRACTITIONER

## 2022-08-03 PROCEDURE — 3074F SYST BP LT 130 MM HG: CPT | Performed by: NURSE PRACTITIONER

## 2022-08-03 PROCEDURE — U0002 COVID-19 LAB TEST NON-CDC: HCPCS | Performed by: NURSE PRACTITIONER

## 2022-08-03 PROCEDURE — 3078F DIAST BP <80 MM HG: CPT | Performed by: NURSE PRACTITIONER

## 2022-09-14 ENCOUNTER — NURSE TRIAGE (OUTPATIENT)
Dept: FAMILY MEDICINE CLINIC | Facility: CLINIC | Age: 57
End: 2022-09-14

## 2022-09-14 NOTE — TELEPHONE ENCOUNTER
Patient needs to be seen in the office by a provider for prednisone.    Advise patient to keep appointment with Dr. Mariposa Moody on 9/16/22

## 2022-09-14 NOTE — TELEPHONE ENCOUNTER
Advised patient of Citlalli's note. Patient verbalized understanding.      Future Appointments   Date Time Provider Ana Rosa Blanca   9/16/2022  3:40 PM Jesus Rowan DO [facility] [place]

## 2022-10-18 DIAGNOSIS — M25.50 POLYARTHRALGIA: ICD-10-CM

## 2022-10-18 RX ORDER — MELOXICAM 15 MG/1
15 TABLET ORAL DAILY
Qty: 30 TABLET | Refills: 2 | Status: SHIPPED | OUTPATIENT
Start: 2022-10-18

## 2022-10-19 NOTE — TELEPHONE ENCOUNTER
Refill passed per [address] [place] protocol. Requested Prescriptions   Pending Prescriptions Disp Refills    Meloxicam 15 MG Oral Tab 30 tablet 2     Sig: Take 1 tablet (15 mg total) by mouth daily.         Non-Narcotic Pain Medication Protocol Passed - 10/18/2022  8:33 PM        Passed - In person appointment or virtual visit in the past 6 mos or appointment in next 3 mos       Recent Outpatient Visits              2 months ago Viral URI    [address], [place], APRN    Office Visit    4 months ago Liz Melendez MD    Office Visit    6 months ago Administrative encounter    [facility] Virtual Visit Fabiana Valverde APRN    E-Visit    6 months ago Positive ALEXA (antinuclear antibody)    [facility] for Angelica Delgadillo MD    Office Visit    9 months ago Visit for counseling    Veronica Melendez MD    Telemedicine     Future Appointments         Provider Department Appt Notes    In 1 month Fawn Heredia MD [address] [facility],                   Future Appointments         Provider Department Appt Notes    In 1 month Fawn Heredia MD [address] [facility] [place],          Recent Outpatient Visits              2 months ago Viral URI    [address], [place], APRN    Office Visit    4 months ago [address] [facility], [address] Carvel Paul, MD    Office Visit    6 months ago Administrative encounter    [facility] Virtual Visit Fabiana Valverde APRN    E-Visit    6 months ago Positive ALEXA (antinuclear antibody)    [facility] for Lynette Butt, Chong Angelucci, MD    Office Visit    9 months ago Visit for counseling    [facility] Tommy Price, [address] Jodee Servin, Jesus Alberto Lawrence MD    Telemedicine

## 2022-11-29 ENCOUNTER — OFFICE VISIT (OUTPATIENT)
Dept: FAMILY MEDICINE CLINIC | Facility: CLINIC | Age: 57
End: 2022-11-29
Payer: COMMERCIAL

## 2022-11-29 VITALS
WEIGHT: 140 LBS | OXYGEN SATURATION: 98 % | HEIGHT: 63 IN | TEMPERATURE: 97 F | SYSTOLIC BLOOD PRESSURE: 115 MMHG | DIASTOLIC BLOOD PRESSURE: 66 MMHG | BODY MASS INDEX: 24.8 KG/M2 | HEART RATE: 52 BPM | RESPIRATION RATE: 14 BRPM

## 2022-11-29 DIAGNOSIS — Z20.822 CLOSE EXPOSURE TO COVID-19 VIRUS: Primary | ICD-10-CM

## 2022-11-29 PROCEDURE — 3008F BODY MASS INDEX DOCD: CPT | Performed by: NURSE PRACTITIONER

## 2022-11-29 PROCEDURE — 3074F SYST BP LT 130 MM HG: CPT | Performed by: NURSE PRACTITIONER

## 2022-11-29 PROCEDURE — 99213 OFFICE O/P EST LOW 20 MIN: CPT | Performed by: NURSE PRACTITIONER

## 2022-11-29 PROCEDURE — 3078F DIAST BP <80 MM HG: CPT | Performed by: NURSE PRACTITIONER

## 2022-11-30 LAB — SARS-COV-2 RNA RESP QL NAA+PROBE: NOT DETECTED

## 2023-02-02 ENCOUNTER — OFFICE VISIT (OUTPATIENT)
Dept: INTERNAL MEDICINE CLINIC | Facility: CLINIC | Age: 58
End: 2023-02-02

## 2023-02-02 VITALS
TEMPERATURE: 98 F | DIASTOLIC BLOOD PRESSURE: 84 MMHG | BODY MASS INDEX: 25.16 KG/M2 | WEIGHT: 142 LBS | HEART RATE: 56 BPM | SYSTOLIC BLOOD PRESSURE: 132 MMHG | HEIGHT: 63 IN

## 2023-02-02 DIAGNOSIS — M25.50 ARTHRALGIA, UNSPECIFIED JOINT: Primary | ICD-10-CM

## 2023-02-02 DIAGNOSIS — Z78.0 POST-MENOPAUSAL: ICD-10-CM

## 2023-02-02 DIAGNOSIS — M54.50 ACUTE BILATERAL LOW BACK PAIN WITHOUT SCIATICA: ICD-10-CM

## 2023-02-02 DIAGNOSIS — M25.551 RIGHT HIP PAIN: ICD-10-CM

## 2023-02-02 PROCEDURE — 3079F DIAST BP 80-89 MM HG: CPT | Performed by: PHYSICIAN ASSISTANT

## 2023-02-02 PROCEDURE — 3075F SYST BP GE 130 - 139MM HG: CPT | Performed by: PHYSICIAN ASSISTANT

## 2023-02-02 PROCEDURE — 3008F BODY MASS INDEX DOCD: CPT | Performed by: PHYSICIAN ASSISTANT

## 2023-02-02 PROCEDURE — 99214 OFFICE O/P EST MOD 30 MIN: CPT | Performed by: PHYSICIAN ASSISTANT

## 2023-02-02 RX ORDER — METHOCARBAMOL 750 MG/1
750 TABLET, FILM COATED ORAL 3 TIMES DAILY PRN
Qty: 30 TABLET | Refills: 0 | Status: SHIPPED | OUTPATIENT
Start: 2023-02-02

## 2023-02-03 ENCOUNTER — TELEPHONE (OUTPATIENT)
Dept: INTERNAL MEDICINE CLINIC | Facility: CLINIC | Age: 58
End: 2023-02-03

## 2023-02-03 ENCOUNTER — HOSPITAL ENCOUNTER (OUTPATIENT)
Dept: GENERAL RADIOLOGY | Age: 58
Discharge: HOME | End: 2023-02-03
Attending: PHYSICIAN ASSISTANT
Payer: COMMERCIAL

## 2023-02-03 DIAGNOSIS — M25.50 POLYARTHRALGIA: ICD-10-CM

## 2023-02-03 DIAGNOSIS — M25.551 RIGHT HIP PAIN: ICD-10-CM

## 2023-02-03 DIAGNOSIS — M54.50 ACUTE BILATERAL LOW BACK PAIN WITHOUT SCIATICA: ICD-10-CM

## 2023-02-03 PROCEDURE — 72110 X-RAY EXAM L-2 SPINE 4/>VWS: CPT | Performed by: PHYSICIAN ASSISTANT

## 2023-02-03 PROCEDURE — 73502 X-RAY EXAM HIP UNI 2-3 VIEWS: CPT | Performed by: PHYSICIAN ASSISTANT

## 2023-02-03 NOTE — TELEPHONE ENCOUNTER
Patient had office visit with Avelina Farley PA-C yesterday for back pain. She states that this morning she was getting out of bed and she put right foot down, and the way she moved her body she was unable to straighten up. Denies any paresthesias, shortness of breath, chest pain. She did take Methocarbamol as directed, she states did not help. She is requesting alternative medication for pain.      Lower back pain--> Pain 8/10    She would like Rx to be sent to:  [facility] [phone], Lisa [address] to review above and prescribe as appropriate

## 2023-02-04 ENCOUNTER — LAB ENCOUNTER (OUTPATIENT)
Dept: LAB | Age: 58
End: 2023-02-04
Attending: PHYSICIAN ASSISTANT
Payer: COMMERCIAL

## 2023-02-04 DIAGNOSIS — R76.8 POSITIVE ANA (ANTINUCLEAR ANTIBODY): ICD-10-CM

## 2023-02-04 DIAGNOSIS — Z78.0 POST-MENOPAUSAL: ICD-10-CM

## 2023-02-04 DIAGNOSIS — M25.50 POLYARTHRALGIA: ICD-10-CM

## 2023-02-04 DIAGNOSIS — G89.29 CHRONIC RIGHT-SIDED LOW BACK PAIN WITH RIGHT-SIDED SCIATICA: Primary | ICD-10-CM

## 2023-02-04 DIAGNOSIS — M54.41 CHRONIC RIGHT-SIDED LOW BACK PAIN WITH RIGHT-SIDED SCIATICA: Primary | ICD-10-CM

## 2023-02-04 DIAGNOSIS — M25.50 ARTHRALGIA, UNSPECIFIED JOINT: ICD-10-CM

## 2023-02-04 LAB
ESTRADIOL SERPL-MCNC: 12.9 PG/ML
FSH SERPL-ACNC: 61.6 MIU/ML
LH SERPL-ACNC: 19.7 MIU/ML
PROGEST SERPL-MCNC: 0.22 NG/ML
TESTOST SERPL-MCNC: 9.71 NG/DL
TSI SER-ACNC: 1.98 MIU/ML (ref 0.36–3.74)

## 2023-02-04 PROCEDURE — 36415 COLL VENOUS BLD VENIPUNCTURE: CPT

## 2023-02-04 PROCEDURE — 84403 ASSAY OF TOTAL TESTOSTERONE: CPT

## 2023-02-04 PROCEDURE — 84443 ASSAY THYROID STIM HORMONE: CPT

## 2023-02-04 PROCEDURE — 83001 ASSAY OF GONADOTROPIN (FSH): CPT

## 2023-02-04 PROCEDURE — 84144 ASSAY OF PROGESTERONE: CPT

## 2023-02-04 PROCEDURE — 83002 ASSAY OF GONADOTROPIN (LH): CPT

## 2023-02-04 PROCEDURE — 82670 ASSAY OF TOTAL ESTRADIOL: CPT

## 2023-02-04 PROCEDURE — 81374 HLA I TYPING 1 ANTIGEN LR: CPT

## 2023-02-04 RX ORDER — CYCLOBENZAPRINE HCL 10 MG
10 TABLET ORAL 3 TIMES DAILY
Qty: 30 TABLET | Refills: 0 | Status: SHIPPED | OUTPATIENT
Start: 2023-02-04 | End: 2023-02-04

## 2023-02-04 RX ORDER — MELOXICAM 15 MG/1
15 TABLET ORAL DAILY
Qty: 30 TABLET | Refills: 2 | Status: SHIPPED | OUTPATIENT
Start: 2023-02-04 | End: 2023-02-04

## 2023-02-04 RX ORDER — CYCLOBENZAPRINE HCL 10 MG
10 TABLET ORAL 3 TIMES DAILY
Qty: 30 TABLET | Refills: 0 | Status: SHIPPED | OUTPATIENT
Start: 2023-02-04

## 2023-02-04 RX ORDER — MELOXICAM 15 MG/1
15 TABLET ORAL DAILY
Qty: 30 TABLET | Refills: 2 | Status: SHIPPED | OUTPATIENT
Start: 2023-02-04

## 2023-02-04 NOTE — TELEPHONE ENCOUNTER
Patient is calling regarding not below. Advised of medication sent.  [facility] [place] [facility] also asked to have other pharmacies removed (done)

## 2023-02-07 LAB — HLA-B27: NEGATIVE

## 2023-02-23 ENCOUNTER — OFFICE VISIT (OUTPATIENT)
Dept: FAMILY MEDICINE CLINIC | Facility: CLINIC | Age: 58
End: 2023-02-23
Payer: COMMERCIAL

## 2023-02-23 VITALS
WEIGHT: 140 LBS | SYSTOLIC BLOOD PRESSURE: 120 MMHG | BODY MASS INDEX: 24.8 KG/M2 | HEART RATE: 76 BPM | DIASTOLIC BLOOD PRESSURE: 60 MMHG | TEMPERATURE: 98 F | RESPIRATION RATE: 16 BRPM | OXYGEN SATURATION: 97 % | HEIGHT: 63 IN

## 2023-02-23 DIAGNOSIS — R10.13 EPIGASTRIC DISCOMFORT: ICD-10-CM

## 2023-02-23 DIAGNOSIS — J02.9 SORE THROAT: ICD-10-CM

## 2023-02-23 DIAGNOSIS — J06.9 VIRAL URI: Primary | ICD-10-CM

## 2023-02-23 LAB
CONTROL LINE PRESENT WITH A CLEAR BACKGROUND (YES/NO): YES YES/NO
OPERATOR ID: NORMAL
POCT LOT NUMBER: NORMAL
RAPID SARS-COV-2 BY PCR: NOT DETECTED
STREP GRP A CUL-SCR: NEGATIVE

## 2023-02-23 PROCEDURE — 3008F BODY MASS INDEX DOCD: CPT | Performed by: NURSE PRACTITIONER

## 2023-02-23 PROCEDURE — 99213 OFFICE O/P EST LOW 20 MIN: CPT | Performed by: NURSE PRACTITIONER

## 2023-02-23 PROCEDURE — 3074F SYST BP LT 130 MM HG: CPT | Performed by: NURSE PRACTITIONER

## 2023-02-23 PROCEDURE — 87880 STREP A ASSAY W/OPTIC: CPT | Performed by: NURSE PRACTITIONER

## 2023-02-23 PROCEDURE — U0002 COVID-19 LAB TEST NON-CDC: HCPCS | Performed by: NURSE PRACTITIONER

## 2023-02-23 PROCEDURE — 3078F DIAST BP <80 MM HG: CPT | Performed by: NURSE PRACTITIONER

## 2023-02-23 NOTE — PATIENT INSTRUCTIONS
-Push fluids and plenty of rest    -simple foods like soups, toast and rice while not feeling your best.  -OTC cough medicine such as Mucinex DM or Robitussin DM (guaifenesin and dextromethorphan) as packet insert for dry and congested cough. -OTC Tylenol/Ibuprofen as packet insert If no allergies  -Soothing cough drops as packet insert   -Flonase 2 sprays each nostril daily as packet insert.   Stop if any nose bleeds  -Good handwashing, to prevent spread of virus    -Face mask helps prevent viral infections    Follow up in 3-5 days for worsening symptoms with clinic or PCP

## 2023-03-17 ENCOUNTER — TELEPHONE (OUTPATIENT)
Dept: FAMILY MEDICINE CLINIC | Facility: CLINIC | Age: 58
End: 2023-03-17

## 2023-03-17 NOTE — TELEPHONE ENCOUNTER
Rivka from [facility] that they will fax over procedure clearance for patient dental work, no pre-op needed    Fax: [phone]

## 2023-03-21 NOTE — TELEPHONE ENCOUNTER
We have not received the clearance form, called Rivka at [phone], no answer. Please re-fax to [phone].     Thank you

## 2023-03-21 NOTE — TELEPHONE ENCOUNTER
[facility] calling to confirm faxed was received and to have it faxed back. Asking for call back to confirm.

## 2023-04-04 ENCOUNTER — HOSPITAL ENCOUNTER (OUTPATIENT)
Age: 58
Discharge: HOME | End: 2023-04-04
Attending: STUDENT IN AN ORGANIZED HEALTH CARE EDUCATION/TRAINING PROGRAM
Payer: COMMERCIAL

## 2023-04-04 ENCOUNTER — APPOINTMENT (OUTPATIENT)
Dept: GENERAL RADIOLOGY | Age: 58
End: 2023-04-04
Attending: STUDENT IN AN ORGANIZED HEALTH CARE EDUCATION/TRAINING PROGRAM
Payer: COMMERCIAL

## 2023-04-04 VITALS
WEIGHT: 138 LBS | TEMPERATURE: 98 F | DIASTOLIC BLOOD PRESSURE: 75 MMHG | RESPIRATION RATE: 18 BRPM | OXYGEN SATURATION: 100 % | HEART RATE: 57 BPM | SYSTOLIC BLOOD PRESSURE: 131 MMHG | BODY MASS INDEX: 24 KG/M2

## 2023-04-04 DIAGNOSIS — R00.2 PALPITATIONS: Primary | ICD-10-CM

## 2023-04-04 DIAGNOSIS — R60.0 LOWER EXTREMITY EDEMA: ICD-10-CM

## 2023-04-04 LAB
#MXD IC: 0.5 X10ˆ3/UL (ref 0.1–1)
BUN BLD-MCNC: 18 MG/DL (ref 7–18)
CHLORIDE BLD-SCNC: 101 MMOL/L (ref 98–112)
CO2 BLD-SCNC: 29 MMOL/L (ref 21–32)
CREAT BLD-MCNC: 0.7 MG/DL
GFR SERPLBLD BASED ON 1.73 SQ M-ARVRAT: 100 ML/MIN/1.73M2 (ref 60–?)
GLUCOSE BLD-MCNC: 134 MG/DL (ref 70–99)
HCT VFR BLD AUTO: 39.5 %
HCT VFR BLD CALC: 39 %
HGB BLD-MCNC: 12.8 G/DL
ISTAT IONIZED CALCIUM FOR CHEM 8: 1.31 MMOL/L (ref 1.12–1.32)
LYMPHOCYTES # BLD AUTO: 2.3 X10ˆ3/UL (ref 1–4)
LYMPHOCYTES NFR BLD AUTO: 31.7 %
MCH RBC QN AUTO: 30.6 PG (ref 26–34)
MCHC RBC AUTO-ENTMCNC: 32.4 G/DL (ref 31–37)
MCV RBC AUTO: 94.5 FL (ref 80–100)
MIXED CELL %: 7.4 %
NEUTROPHILS # BLD AUTO: 4.3 X10ˆ3/UL (ref 1.5–7.7)
NEUTROPHILS NFR BLD AUTO: 60.9 %
PLATELET # BLD AUTO: 230 X10ˆ3/UL (ref 150–450)
POTASSIUM BLD-SCNC: 4.3 MMOL/L (ref 3.6–5.1)
RBC # BLD AUTO: 4.18 X10ˆ6/UL
SODIUM BLD-SCNC: 140 MMOL/L (ref 136–145)
TROPONIN I BLD-MCNC: <0.02 NG/ML
WBC # BLD AUTO: 7.1 X10ˆ3/UL (ref 4–11)

## 2023-04-04 PROCEDURE — 71046 X-RAY EXAM CHEST 2 VIEWS: CPT | Performed by: STUDENT IN AN ORGANIZED HEALTH CARE EDUCATION/TRAINING PROGRAM

## 2023-04-04 PROCEDURE — 93005 ELECTROCARDIOGRAM TRACING: CPT

## 2023-04-04 PROCEDURE — 85025 COMPLETE CBC W/AUTO DIFF WBC: CPT | Performed by: STUDENT IN AN ORGANIZED HEALTH CARE EDUCATION/TRAINING PROGRAM

## 2023-04-04 PROCEDURE — 84484 ASSAY OF TROPONIN QUANT: CPT

## 2023-04-04 PROCEDURE — 80047 BASIC METABLC PNL IONIZED CA: CPT

## 2023-04-04 RX ORDER — IBUPROFEN 600 MG/1
600 TABLET ORAL EVERY 8 HOURS PRN
COMMUNITY
Start: 2023-03-29

## 2023-04-04 RX ORDER — MULTIVIT WITH MINERALS/LUTEIN
1000 TABLET ORAL DAILY
COMMUNITY

## 2023-04-05 LAB
ATRIAL RATE: 59 BPM
P AXIS: 72 DEGREES
P-R INTERVAL: 160 MS
Q-T INTERVAL: 400 MS
QRS DURATION: 74 MS
QTC CALCULATION (BEZET): 396 MS
R AXIS: 60 DEGREES
T AXIS: 65 DEGREES
VENTRICULAR RATE: 59 BPM

## 2023-04-05 NOTE — ED INITIAL ASSESSMENT (HPI)
Patient states 7 days ago she had all of her teeth pulled and was sedated for the procedure. States since the procedure, she has been waking up lightheaded, heart feels like it's racing, symptoms start to resolve throughout the morning but then are intermittent throughout the day. States she has been sleeping sitting up due to her oral surgery and increased facial swelling when she lies down. States she has taken motrin 600mg PRN, last dose was 2pm today.

## 2023-04-19 ENCOUNTER — PATIENT OUTREACH (OUTPATIENT)
Dept: FAMILY MEDICINE CLINIC | Facility: CLINIC | Age: 58
End: 2023-04-19

## 2023-08-08 ENCOUNTER — TELEPHONE (OUTPATIENT)
Dept: FAMILY MEDICINE CLINIC | Facility: CLINIC | Age: 58
End: 2023-08-08

## 2023-08-18 ENCOUNTER — OFFICE VISIT (OUTPATIENT)
Dept: FAMILY MEDICINE CLINIC | Facility: CLINIC | Age: 58
End: 2023-08-18
Payer: COMMERCIAL

## 2023-08-18 VITALS
WEIGHT: 130 LBS | SYSTOLIC BLOOD PRESSURE: 104 MMHG | OXYGEN SATURATION: 97 % | DIASTOLIC BLOOD PRESSURE: 68 MMHG | HEART RATE: 55 BPM | RESPIRATION RATE: 14 BRPM | TEMPERATURE: 98 F | BODY MASS INDEX: 23.04 KG/M2 | HEIGHT: 63 IN

## 2023-08-18 DIAGNOSIS — I49.3 PVC (PREMATURE VENTRICULAR CONTRACTION): ICD-10-CM

## 2023-08-18 DIAGNOSIS — R00.1 BRADYCARDIA: ICD-10-CM

## 2023-08-18 DIAGNOSIS — R53.83 FATIGUE, UNSPECIFIED TYPE: Primary | ICD-10-CM

## 2023-08-18 LAB
GLUCOSE BLOOD: 94
TEST STRIP LOT #: NORMAL NUMERIC

## 2023-08-31 ENCOUNTER — TELEPHONE (OUTPATIENT)
Dept: FAMILY MEDICINE CLINIC | Facility: CLINIC | Age: 58
End: 2023-08-31

## 2023-10-24 ENCOUNTER — TELEPHONE (OUTPATIENT)
Dept: FAMILY MEDICINE CLINIC | Facility: CLINIC | Age: 58
End: 2023-10-24

## 2023-10-26 ENCOUNTER — OFFICE VISIT (OUTPATIENT)
Dept: FAMILY MEDICINE CLINIC | Facility: CLINIC | Age: 58
End: 2023-10-26

## 2023-10-26 VITALS
DIASTOLIC BLOOD PRESSURE: 78 MMHG | BODY MASS INDEX: 22.54 KG/M2 | HEART RATE: 53 BPM | WEIGHT: 127.19 LBS | OXYGEN SATURATION: 97 % | HEIGHT: 63 IN | TEMPERATURE: 98 F | SYSTOLIC BLOOD PRESSURE: 120 MMHG

## 2023-10-26 DIAGNOSIS — Z01.419 ENCOUNTER FOR WELL WOMAN EXAM WITH ROUTINE GYNECOLOGICAL EXAM: Primary | ICD-10-CM

## 2023-10-26 DIAGNOSIS — E55.9 VITAMIN D DEFICIENCY: ICD-10-CM

## 2023-10-26 DIAGNOSIS — M81.0 AGE-RELATED OSTEOPOROSIS WITHOUT CURRENT PATHOLOGICAL FRACTURE: ICD-10-CM

## 2023-10-26 DIAGNOSIS — R00.2 PALPITATIONS: ICD-10-CM

## 2023-10-26 DIAGNOSIS — N95.0 POSTMENOPAUSAL BLEEDING: ICD-10-CM

## 2023-10-26 DIAGNOSIS — Z12.31 ENCOUNTER FOR SCREENING MAMMOGRAM FOR MALIGNANT NEOPLASM OF BREAST: ICD-10-CM

## 2023-10-26 DIAGNOSIS — Z13.21 ENCOUNTER FOR VITAMIN DEFICIENCY SCREENING: ICD-10-CM

## 2023-10-26 DIAGNOSIS — Z12.11 SCREEN FOR COLON CANCER: ICD-10-CM

## 2023-10-26 DIAGNOSIS — Z12.4 SCREENING FOR CERVICAL CANCER: ICD-10-CM

## 2023-10-26 PROCEDURE — 3078F DIAST BP <80 MM HG: CPT | Performed by: STUDENT IN AN ORGANIZED HEALTH CARE EDUCATION/TRAINING PROGRAM

## 2023-10-26 PROCEDURE — 99396 PREV VISIT EST AGE 40-64: CPT | Performed by: STUDENT IN AN ORGANIZED HEALTH CARE EDUCATION/TRAINING PROGRAM

## 2023-10-26 PROCEDURE — 99214 OFFICE O/P EST MOD 30 MIN: CPT | Performed by: STUDENT IN AN ORGANIZED HEALTH CARE EDUCATION/TRAINING PROGRAM

## 2023-10-26 PROCEDURE — 3008F BODY MASS INDEX DOCD: CPT | Performed by: STUDENT IN AN ORGANIZED HEALTH CARE EDUCATION/TRAINING PROGRAM

## 2023-10-26 PROCEDURE — 3074F SYST BP LT 130 MM HG: CPT | Performed by: STUDENT IN AN ORGANIZED HEALTH CARE EDUCATION/TRAINING PROGRAM

## 2023-10-26 RX ORDER — CHLORHEXIDINE GLUCONATE ORAL RINSE 1.2 MG/ML
SOLUTION DENTAL
COMMUNITY
Start: 2023-10-12

## 2023-10-27 ENCOUNTER — LAB ENCOUNTER (OUTPATIENT)
Dept: LAB | Age: 58
End: 2023-10-27
Attending: STUDENT IN AN ORGANIZED HEALTH CARE EDUCATION/TRAINING PROGRAM

## 2023-10-27 ENCOUNTER — HOSPITAL ENCOUNTER (OUTPATIENT)
Dept: MAMMOGRAPHY | Age: 58
Discharge: HOME | End: 2023-10-27
Attending: STUDENT IN AN ORGANIZED HEALTH CARE EDUCATION/TRAINING PROGRAM

## 2023-10-27 DIAGNOSIS — Z13.21 ENCOUNTER FOR VITAMIN DEFICIENCY SCREENING: ICD-10-CM

## 2023-10-27 DIAGNOSIS — E55.9 VITAMIN D DEFICIENCY: ICD-10-CM

## 2023-10-27 DIAGNOSIS — Z12.31 ENCOUNTER FOR SCREENING MAMMOGRAM FOR MALIGNANT NEOPLASM OF BREAST: ICD-10-CM

## 2023-10-27 DIAGNOSIS — Z01.419 ENCOUNTER FOR WELL WOMAN EXAM WITH ROUTINE GYNECOLOGICAL EXAM: ICD-10-CM

## 2023-10-27 LAB
DEPRECATED HBV CORE AB SER IA-ACNC: 85.9 NG/ML
ERYTHROCYTE [DISTWIDTH] IN BLOOD BY AUTOMATED COUNT: 12.5 %
FOLATE SERPL-MCNC: 12.4 NG/ML (ref 8.7–?)
HCT VFR BLD AUTO: 45.6 %
HGB BLD-MCNC: 14.5 G/DL
HPV I/H RISK 1 DNA SPEC QL NAA+PROBE: NEGATIVE
IRON SATN MFR SERPL: 21 %
IRON SERPL-MCNC: 90 UG/DL
MCH RBC QN AUTO: 30.8 PG (ref 26–34)
MCHC RBC AUTO-ENTMCNC: 31.8 G/DL (ref 31–37)
MCV RBC AUTO: 96.8 FL
PLATELET # BLD AUTO: 223 10(3)UL (ref 150–450)
RBC # BLD AUTO: 4.71 X10(6)UL
TIBC SERPL-MCNC: 426 UG/DL (ref 240–450)
TRANSFERRIN SERPL-MCNC: 286 MG/DL (ref 200–360)
VIT B12 SERPL-MCNC: 472 PG/ML (ref 193–986)
VIT D+METAB SERPL-MCNC: 28 NG/ML (ref 30–100)
WBC # BLD AUTO: 5.9 X10(3) UL (ref 4–11)

## 2023-10-27 PROCEDURE — 82728 ASSAY OF FERRITIN: CPT

## 2023-10-27 PROCEDURE — 77063 BREAST TOMOSYNTHESIS BI: CPT | Performed by: STUDENT IN AN ORGANIZED HEALTH CARE EDUCATION/TRAINING PROGRAM

## 2023-10-27 PROCEDURE — 83550 IRON BINDING TEST: CPT

## 2023-10-27 PROCEDURE — 77067 SCR MAMMO BI INCL CAD: CPT | Performed by: STUDENT IN AN ORGANIZED HEALTH CARE EDUCATION/TRAINING PROGRAM

## 2023-10-27 PROCEDURE — 82746 ASSAY OF FOLIC ACID SERUM: CPT

## 2023-10-27 PROCEDURE — 83540 ASSAY OF IRON: CPT

## 2023-10-27 PROCEDURE — 85027 COMPLETE CBC AUTOMATED: CPT

## 2023-10-27 PROCEDURE — 82306 VITAMIN D 25 HYDROXY: CPT

## 2023-10-27 PROCEDURE — 82607 VITAMIN B-12: CPT

## 2023-10-28 ENCOUNTER — HOSPITAL ENCOUNTER (OUTPATIENT)
Dept: CV DIAGNOSTICS | Facility: HOSPITAL | Age: 58
Discharge: HOME | End: 2023-10-28
Attending: STUDENT IN AN ORGANIZED HEALTH CARE EDUCATION/TRAINING PROGRAM

## 2023-10-28 ENCOUNTER — LAB ENCOUNTER (OUTPATIENT)
Dept: LAB | Facility: HOSPITAL | Age: 58
End: 2023-10-28
Attending: STUDENT IN AN ORGANIZED HEALTH CARE EDUCATION/TRAINING PROGRAM

## 2023-10-28 DIAGNOSIS — Z01.419 ENCOUNTER FOR WELL WOMAN EXAM WITH ROUTINE GYNECOLOGICAL EXAM: ICD-10-CM

## 2023-10-28 DIAGNOSIS — Z12.11 SCREEN FOR COLON CANCER: ICD-10-CM

## 2023-10-28 DIAGNOSIS — R00.2 PALPITATIONS: ICD-10-CM

## 2023-10-28 LAB
ALBUMIN SERPL-MCNC: 3.8 G/DL (ref 3.4–5)
ALBUMIN/GLOB SERPL: 1 {RATIO} (ref 1–2)
ALP LIVER SERPL-CCNC: 96 U/L
ALT SERPL-CCNC: 19 U/L
ANION GAP SERPL CALC-SCNC: 5 MMOL/L (ref 0–18)
AST SERPL-CCNC: 25 U/L (ref 15–37)
BILIRUB SERPL-MCNC: 1.8 MG/DL (ref 0.1–2)
BUN BLD-MCNC: 10 MG/DL (ref 7–18)
CALCIUM BLD-MCNC: 9.4 MG/DL (ref 8.5–10.1)
CHLORIDE SERPL-SCNC: 107 MMOL/L (ref 98–112)
CHOLEST SERPL-MCNC: 169 MG/DL (ref ?–200)
CO2 SERPL-SCNC: 29 MMOL/L (ref 21–32)
CREAT BLD-MCNC: 0.79 MG/DL
EGFRCR SERPLBLD CKD-EPI 2021: 86 ML/MIN/1.73M2 (ref 60–?)
EST. AVERAGE GLUCOSE BLD GHB EST-MCNC: 111 MG/DL (ref 68–126)
FASTING PATIENT LIPID ANSWER: YES
FASTING STATUS PATIENT QL REPORTED: YES
GLOBULIN PLAS-MCNC: 3.9 G/DL (ref 2.8–4.4)
GLUCOSE BLD-MCNC: 93 MG/DL (ref 70–99)
HBA1C MFR BLD: 5.5 % (ref ?–5.7)
HDLC SERPL-MCNC: 56 MG/DL (ref 40–59)
HEMOCCULT STL QL: NEGATIVE
LDLC SERPL CALC-MCNC: 99 MG/DL (ref ?–100)
NONHDLC SERPL-MCNC: 113 MG/DL (ref ?–130)
OSMOLALITY SERPL CALC.SUM OF ELEC: 291 MOSM/KG (ref 275–295)
POTASSIUM SERPL-SCNC: 4.1 MMOL/L (ref 3.5–5.1)
PROT SERPL-MCNC: 7.7 G/DL (ref 6.4–8.2)
SODIUM SERPL-SCNC: 141 MMOL/L (ref 136–145)
TRIGL SERPL-MCNC: 75 MG/DL (ref 30–149)
TSI SER-ACNC: 2.59 MIU/ML (ref 0.36–3.74)
VLDLC SERPL CALC-MCNC: 12 MG/DL (ref 0–30)

## 2023-10-28 PROCEDURE — 80061 LIPID PANEL: CPT

## 2023-10-28 PROCEDURE — 93226 XTRNL ECG REC<48 HR SCAN A/R: CPT | Performed by: STUDENT IN AN ORGANIZED HEALTH CARE EDUCATION/TRAINING PROGRAM

## 2023-10-28 PROCEDURE — 93225 XTRNL ECG REC<48 HRS REC: CPT | Performed by: STUDENT IN AN ORGANIZED HEALTH CARE EDUCATION/TRAINING PROGRAM

## 2023-10-28 PROCEDURE — 80053 COMPREHEN METABOLIC PANEL: CPT

## 2023-10-28 PROCEDURE — 83036 HEMOGLOBIN GLYCOSYLATED A1C: CPT

## 2023-10-28 PROCEDURE — 84443 ASSAY THYROID STIM HORMONE: CPT

## 2023-10-28 PROCEDURE — 82274 ASSAY TEST FOR BLOOD FECAL: CPT

## 2023-10-28 PROCEDURE — 36415 COLL VENOUS BLD VENIPUNCTURE: CPT

## 2023-11-11 ENCOUNTER — PATIENT MESSAGE (OUTPATIENT)
Dept: FAMILY MEDICINE CLINIC | Facility: CLINIC | Age: 58
End: 2023-11-11

## 2023-11-11 DIAGNOSIS — R00.2 PALPITATIONS: Primary | ICD-10-CM

## 2023-11-11 DIAGNOSIS — I49.3 PVC (PREMATURE VENTRICULAR CONTRACTION): ICD-10-CM

## 2023-11-11 DIAGNOSIS — R42 DIZZINESS: ICD-10-CM

## 2023-11-12 NOTE — TELEPHONE ENCOUNTER
Dr. Velia Olson, please see patient's response from your result note below:    Your heart monitor shows occasional extra beats (PVCs, supraventricular beats) which are benign. You reported symptoms during normal rhythm and during times with extra beats. No need for treatment at this time, however if your symptoms progress or if you are notably bothered by these symptoms, we could consider medication daily to prevent episodes. Please contact us with any questions. Written by Priscilla Low MD on 2023  9:11 AM CST  Seen by patient Carlos Arreola on 2023  2:34 PM    From: Carlos Arreola  To: Priscilla Low  Sent: 2023  9:22 AM CST  Subject: Test Results PVC    Can PVCs create a sense of dizziness, which creates a sense of nausea. Since PVCs more often, when sitting I feel an imbalance and dizziness which then creates incrrase in palpitations. Deep breath helps. Occurring regularly within last month.

## 2024-02-29 ENCOUNTER — OFFICE VISIT (OUTPATIENT)
Dept: FAMILY MEDICINE CLINIC | Facility: CLINIC | Age: 59
End: 2024-02-29
Payer: COMMERCIAL

## 2024-02-29 VITALS
OXYGEN SATURATION: 97 % | WEIGHT: 124 LBS | HEIGHT: 63 IN | RESPIRATION RATE: 16 BRPM | TEMPERATURE: 100 F | DIASTOLIC BLOOD PRESSURE: 74 MMHG | BODY MASS INDEX: 21.97 KG/M2 | SYSTOLIC BLOOD PRESSURE: 114 MMHG | HEART RATE: 101 BPM

## 2024-02-29 DIAGNOSIS — Z01.89 PATIENT REQUESTED DIAGNOSTIC TESTING: ICD-10-CM

## 2024-02-29 DIAGNOSIS — J06.9 VIRAL UPPER RESPIRATORY ILLNESS: Primary | ICD-10-CM

## 2024-02-29 DIAGNOSIS — J02.9 SORE THROAT: ICD-10-CM

## 2024-02-29 LAB
CONTROL LINE PRESENT WITH A CLEAR BACKGROUND (YES/NO): YES YES/NO
KIT LOT #: NORMAL NUMERIC
STREP GRP A CUL-SCR: NEGATIVE

## 2024-02-29 PROCEDURE — 99213 OFFICE O/P EST LOW 20 MIN: CPT

## 2024-02-29 PROCEDURE — 87637 SARSCOV2&INF A&B&RSV AMP PRB: CPT

## 2024-02-29 PROCEDURE — 87880 STREP A ASSAY W/OPTIC: CPT

## 2024-02-29 RX ORDER — PHYTONADIONE (VIT K1) 100 MCG
TABLET ORAL
COMMUNITY

## 2024-02-29 NOTE — PROGRESS NOTES
CHIEF COMPLAINT:     Chief Complaint   Patient presents with    Flu       HPI:   Lucy Dexter is a 58 year old female who presents for upper respiratory symptoms for 24 hours. Patient reports fever ( t max 102), chills, headache, right ear pain, sore throat, and body/ joint pain/ aches. Symptoms have been progressing since onset.  Treating symptoms with Excedrin Migraine. Patient reports exposure to sick daughter with same symptoms who was recently traveling in [place], [place], and [place]. Patient states she did not receive her annual influenza vaccine. Patient reports hx of COVID vaccine but states she does not have any boosters.      Current Outpatient Medications   Medication Sig Dispense Refill    Vitamin K, Phytonadione, 100 MCG Oral Tab Take by mouth.      chlorhexidine gluconate 0.12 % Mouth/Throat Solution USE ONCE DAILY AT BEDTIME ONLY. FILL CAP TO LINE, THEN RINSE MOUTH FOR 30 SECONDS AND SPIT. DO NOT SWALLOW. USE FOR 10 DAYS      ibuprofen 600 MG Oral Tab Take 1 tablet (600 mg total) by mouth every 8 (eight) hours as needed.      Ascorbic Acid (VITAMIN C) 1000 MG Oral Tab Take 1 tablet (1,000 mg total) by mouth daily.      methocarbamol 750 MG Oral Tab Take 1 tablet (750 mg total) by mouth 3 (three) times daily as needed. 30 tablet 0    Cholecalciferol (VITAMIN D3) 25 MCG (1000 UT) Oral Cap         Past Medical History:   Diagnosis Date    Adhesive capsulitis of right shoulder     History of pregnancy ,,,2007,, , 2007 SAB    Migraine     MVP (mitral valve prolapse) 1988    Echocardiogram      Past Surgical History:   Procedure Laterality Date    OTHER SURGICAL HISTORY      complete extraction of all teeth         Social History     Socioeconomic History    Marital status:    Tobacco Use    Smoking status: Former     Years: 2     Types: Cigarettes     Quit date: 1988     Years since quittin.2    Smokeless tobacco: Never    Tobacco comments:      socially only   Vaping Use    Vaping Use: Never used   Substance and Sexual Activity    Alcohol use: Yes     Alcohol/week: 0.0 standard drinks of alcohol     Comment: 1-2 drinks/week    Drug use: No   Other Topics Concern    Caffeine Concern No         REVIEW OF SYSTEMS:   GENERAL: Normal appetite  SKIN: no rashes or abnormal skin lesions  HEENT: See HPI  LUNGS: See HPI  CARDIOVASCULAR: denies chest pain or palpitations   GI: denies N/V/C or abdominal pain      EXAM:   /74   Pulse 101   Temp 100.3 °F (37.9 °C) (Oral)   Resp 16   Ht 5' 3\" (1.6 m)   Wt 124 lb (56.2 kg)   LMP  (LMP Unknown)   SpO2 97%   BMI 21.97 kg/m²   Physical Exam  Vitals reviewed.   Constitutional:       General: She is not in acute distress.     Appearance: Normal appearance. She is not ill-appearing or toxic-appearing.   HENT:      Head: Normocephalic and atraumatic.      Right Ear: Ear canal and external ear normal. A middle ear effusion is present. Tympanic membrane is not injected, erythematous, retracted or bulging.      Left Ear: Ear canal and external ear normal. A middle ear effusion is present. Tympanic membrane is not injected, erythematous, retracted or bulging.      Nose: Congestion and rhinorrhea present.      Mouth/Throat:      Mouth: Mucous membranes are moist.      Pharynx: Oropharynx is clear. Uvula midline. Posterior oropharyngeal erythema present. No pharyngeal swelling, oropharyngeal exudate or uvula swelling.      Tonsils: No tonsillar exudate. 2+ on the right. 2+ on the left.   Eyes:      Conjunctiva/sclera: Conjunctivae normal.   Cardiovascular:      Rate and Rhythm: Normal rate and regular rhythm.      Pulses: Normal pulses.      Heart sounds: Normal heart sounds.   Pulmonary:      Effort: Pulmonary effort is normal. No respiratory distress.      Breath sounds: Normal breath sounds. No stridor. No wheezing, rhonchi or rales.   Musculoskeletal:         General: Normal range of motion.      Cervical back:  Normal range of motion and neck supple. No rigidity.   Lymphadenopathy:      Cervical: No cervical adenopathy.   Skin:     General: Skin is warm and dry.      Capillary Refill: Capillary refill takes less than 2 seconds.      Findings: No rash.   Neurological:      General: No focal deficit present.      Mental Status: She is alert and oriented to person, place, and time.   Psychiatric:         Mood and Affect: Mood normal.         Behavior: Behavior normal.          Recent Results (from the past 72 hour(s))   Strep A Assay W/Optic    Collection Time: 02/29/24 10:02 AM   Result Value Ref Range    Strep Grp A Screen Negative Negative    Control Line Present with a clear background (yes/no) Yes Yes/No    Kit Lot # [id number] Numeric    Kit Expiration Date 04/22/2025 Date       ASSESSMENT AND PLAN:   Lucy Dexter is a 58 year old female who presents with upper respiratory symptoms that are consistent with    ASSESSMENT:   Encounter Diagnoses   Name Primary?    Viral upper respiratory illness Yes    Patient requested diagnostic testing     Sore throat      Orders Placed This Encounter   Procedures    Strep A Assay W/Optic    SARS-CoV-2/Flu A and B/RSV by PCR ([facility])       PLAN: Education provided.  Questions answered.  Reassurance given. Rapid STREP test is negative. QUAD test collected and sent to lab; results pending. Discussed with patient symptoms are most likey due to viral infection and will treat according to symptom management. Patient prefers to wait for test reults prior to taking antiviral treatment. Advised patient to continue supportive care: maintain hydration and symptom management with OTC medications as needed.  Comfort Care as listed in Patient Instructions. The patient indicates understanding of these issues and agrees to the plan. The patient is asked to f/u with PCP if sx's persist or worsen. Patient requested note for employer, copy provided and handed to patient upon discharge.

## 2024-03-01 LAB
FLUAV + FLUBV RNA SPEC NAA+PROBE: NOT DETECTED
FLUAV + FLUBV RNA SPEC NAA+PROBE: NOT DETECTED
RSV RNA SPEC NAA+PROBE: NOT DETECTED
SARS-COV-2 RNA RESP QL NAA+PROBE: DETECTED

## 2024-05-08 NOTE — TELEPHONE ENCOUNTER
Mac OCT 05/08/24    OD: No DME, ERM mild with min traction  OS no view     #PDR OU  #OS TRD/RRD s/p partial oil removal     - Patient reporting stable vision   - The severe PDR OD remains stable no evidence of vit hemorrhage:    - [facility]RR[facility] potential but with limited va potential,  inoperable.     -Severe PDR OD stable, no evidence of DME on exam or OCT, no role for therapy at this time  VH cleared  Traction stable  Will observe  Monocular precautions       Follow up 3-4 months, patient instructed to call sooner if new symptoms present   Pt. Calling to f/up on getting a Rx and to get a note extended to be off of work until Friday 6/30, and to return to work on Monday 7/3. Pt. States that she needs to send note by the end of day.

## 2024-06-07 ENCOUNTER — OFFICE VISIT (OUTPATIENT)
Dept: INTERNAL MEDICINE CLINIC | Facility: CLINIC | Age: 59
End: 2024-06-07
Payer: COMMERCIAL

## 2024-06-07 ENCOUNTER — HOSPITAL ENCOUNTER (OUTPATIENT)
Dept: BONE DENSITY | Age: 59
Discharge: HOME | End: 2024-06-07
Attending: STUDENT IN AN ORGANIZED HEALTH CARE EDUCATION/TRAINING PROGRAM
Payer: COMMERCIAL

## 2024-06-07 VITALS
HEART RATE: 58 BPM | SYSTOLIC BLOOD PRESSURE: 126 MMHG | BODY MASS INDEX: 22.02 KG/M2 | DIASTOLIC BLOOD PRESSURE: 78 MMHG | RESPIRATION RATE: 18 BRPM | TEMPERATURE: 97 F | HEIGHT: 63 IN | OXYGEN SATURATION: 98 % | WEIGHT: 124.25 LBS

## 2024-06-07 DIAGNOSIS — M81.0 AGE-RELATED OSTEOPOROSIS WITHOUT CURRENT PATHOLOGICAL FRACTURE: ICD-10-CM

## 2024-06-07 DIAGNOSIS — N89.8 VAGINAL ITCHING: Primary | ICD-10-CM

## 2024-06-07 DIAGNOSIS — Z78.0 MENOPAUSE: ICD-10-CM

## 2024-06-07 PROCEDURE — 99214 OFFICE O/P EST MOD 30 MIN: CPT

## 2024-06-07 PROCEDURE — G2211 COMPLEX E/M VISIT ADD ON: HCPCS

## 2024-06-07 PROCEDURE — 81514 NFCT DS BV&VAGINITIS DNA ALG: CPT

## 2024-06-07 PROCEDURE — 77080 DXA BONE DENSITY AXIAL: CPT | Performed by: STUDENT IN AN ORGANIZED HEALTH CARE EDUCATION/TRAINING PROGRAM

## 2024-06-07 NOTE — PROGRESS NOTES
Lucy Dexter is a 58 year old female.  HPI:   HPI   New to the office. As of now not establishing care at this office.  Pt presents today with c/o vaginal itchiness, burning   daily, mostly at night for the 1wk,   Denies vaginal odor, discharge. Hydrocortisone cream helps.  Hx of vaginal dryness. Has been in menopause since age 39. Has experienced vaginal bleeding once after intercourse, intercourse was uncomfortable. Denies any vaginal bleeding since that one episode.   Current Outpatient Medications   Medication Sig Dispense Refill    methocarbamol 750 MG Oral Tab Take 1 tablet (750 mg total) by mouth 3 (three) times daily as needed. (Patient not taking: Reported on 2024) 30 tablet 0      Past Medical History:    Adhesive capsulitis of right shoulder    History of pregnancy    ,, ,  SAB    Migraine    MVP (mitral valve prolapse)    Echocardiogram      Social History:  Social History     Socioeconomic History    Marital status:    Tobacco Use    Smoking status: Former     Current packs/day: 0.00     Types: Cigarettes     Start date: 1986     Quit date: 1988     Years since quittin.4    Smokeless tobacco: Never    Tobacco comments:     socially only   Vaping Use    Vaping status: Never Used   Substance and Sexual Activity    Alcohol use: Yes     Alcohol/week: 0.0 standard drinks of alcohol     Comment: 1-2 drinks/week    Drug use: No   Other Topics Concern    Caffeine Concern No        REVIEW OF SYSTEMS:   GENERAL HEALTH: feels well otherwise. Denies fever, chills, unintentional weight change  SKIN: denies any unusual skin lesions or rashes  RESPIRATORY: denies shortness of breath with exertion, denies cough or wheezing  CARDIOVASCULAR: denies chest pain or palpitations, denies leg swelling  GI: denies abdominal pain and denies heartburn. Denies nausea, vomiting, diarrhea, constipation  GEU: vaginal itchiness  NEURO: denies headaches, dizziness, weakness,  syncope  PSYCH: denies anxiety, depression, insomnia    EXAM:   /78 (BP Location: Left arm, Patient Position: Sitting)   Pulse 58   Temp 97.1 °F (36.2 °C) (Temporal)   Resp 18   Ht 5' 3\" (1.6 m)   Wt 124 lb 4 oz (56.4 kg)   LMP  (LMP Unknown)   SpO2 98%   BMI 22.01 kg/m²   GENERAL: well developed, well nourished,in no apparent distress  SKIN: no rashes,no suspicious lesions, warm and dry  HEENT: atraumatic, normocephalic,ears and throat are clear  NECK: supple,no adenopathy, no thyromegaly  LUNGS: clear to auscultation b/l no W/R/R  CARDIO: RRR without murmur  GI: good BS's,no masses, HSM, distension or tenderness  GEU: negative for discharge. Vaginal tissue is erythematous.  EXTREMITIES: no cyanosis, clubbing or edema  MUSCULOSKELETAL: FROM, no joint swelling or bony tenderness  NEURO: a/ox3, no focal deficits  PSYCH: mood and affect normal    ASSESSMENT AND PLAN:     Encounter Diagnoses   Name Primary?    Vaginal itching Yes    Menopause     -check vaginitis panel   -discussed if vaginitis panel is negative would start patient on estradiol cream.  Requested Prescriptions      No prescriptions requested or ordered in this encounter         The patient indicates understanding of these issues and agrees to the plan.  The patient is asked to return if symptoms worsen or persist.

## 2024-06-08 ENCOUNTER — PATIENT MESSAGE (OUTPATIENT)
Dept: FAMILY MEDICINE CLINIC | Facility: CLINIC | Age: 59
End: 2024-06-08

## 2024-06-08 DIAGNOSIS — M54.2 NECK PAIN: Primary | ICD-10-CM

## 2024-06-08 LAB
BV BACTERIA DNA VAG QL NAA+PROBE: NEGATIVE
C GLABRATA DNA VAG QL NAA+PROBE: NEGATIVE
C KRUSEI DNA VAG QL NAA+PROBE: NEGATIVE
CANDIDA DNA VAG QL NAA+PROBE: NEGATIVE
T VAGINALIS DNA VAG QL NAA+PROBE: NEGATIVE

## 2024-06-09 NOTE — TELEPHONE ENCOUNTER
Dr. Mcadams, please see patient's follow up message from 6/7/24 Dexa Scan results.     From: Lucy Dexter  To: Mia Mcadams  Sent: 6/8/2024  9:48 AM CDT  Subject: Bone Density Result     Good morning.    I am hesitant in starting treatment with medication. Please advise on best type of Calcium and if Vitamin K should be added as well along with Vitamin D.    Also have had frequent headache due to compression I feel in my thoracic and cervical area.    Both migraine and ice pick stabbing type headaches    I see your next available is July. Should I consider update xray and PT?    Kindly    Lucy Bhatia

## 2024-06-10 NOTE — PROGRESS NOTES
Written by Shana Mota RN on 6/9/2024  9:49 PM CDT View Full Comments  Seen by patient Lucy Michael Dexter on 6/9/2024  9:58 PM

## 2024-06-11 ENCOUNTER — PATIENT MESSAGE (OUTPATIENT)
Dept: INTERNAL MEDICINE CLINIC | Facility: CLINIC | Age: 59
End: 2024-06-11

## 2024-06-11 DIAGNOSIS — N89.8 VAGINAL ITCHING: Primary | ICD-10-CM

## 2024-06-11 DIAGNOSIS — Z78.0 MENOPAUSE: ICD-10-CM

## 2024-06-12 RX ORDER — ESTRADIOL 0.1 MG/G
1 CREAM VAGINAL DAILY
Qty: 42.5 G | Refills: 0 | Status: SHIPPED | OUTPATIENT
Start: 2024-06-12

## 2024-06-12 NOTE — TELEPHONE ENCOUNTER
From: Lucy Dexter  To: Josie Mills  Sent: 6/11/2024 6:55 PM CDT  Subject: Prescription for cream    Per our discussion in my visit, can you request a RX for a vaginal cream that helps with post menopause. OTC cream is not helping with daily itching  The

## 2024-06-12 NOTE — TELEPHONE ENCOUNTER
Patient seen once in office on 6/7. Notified MIGUEL Galindo at office visit would not be establishing care. Current PCP- Dr. Mcadams. [employer] message sent to MIGUEL Galindo. Await response.    MIGUEL Galindo: Estrace- Initial: 1G/day intravaginally for 2 weeks, then 500 mg three times per week

## 2024-09-16 ENCOUNTER — OFFICE VISIT (OUTPATIENT)
Dept: FAMILY MEDICINE CLINIC | Facility: CLINIC | Age: 59
End: 2024-09-16
Payer: COMMERCIAL

## 2024-09-16 VITALS — HEIGHT: 63 IN | BODY MASS INDEX: 22 KG/M2

## 2024-09-16 DIAGNOSIS — Z91.199 NO-SHOW FOR APPOINTMENT: Primary | ICD-10-CM

## 2024-09-24 ENCOUNTER — APPOINTMENT (OUTPATIENT)
Dept: GENERAL RADIOLOGY | Age: 59
End: 2024-09-24
Attending: NURSE PRACTITIONER
Payer: COMMERCIAL

## 2024-09-24 ENCOUNTER — HOSPITAL ENCOUNTER (OUTPATIENT)
Age: 59
Discharge: HOME | End: 2024-09-24
Payer: COMMERCIAL

## 2024-09-24 VITALS
RESPIRATION RATE: 16 BRPM | DIASTOLIC BLOOD PRESSURE: 56 MMHG | HEART RATE: 63 BPM | TEMPERATURE: 99 F | OXYGEN SATURATION: 100 % | SYSTOLIC BLOOD PRESSURE: 106 MMHG

## 2024-09-24 DIAGNOSIS — S86.911A STRAIN OF RIGHT KNEE, INITIAL ENCOUNTER: Primary | ICD-10-CM

## 2024-09-24 PROCEDURE — 73560 X-RAY EXAM OF KNEE 1 OR 2: CPT | Performed by: NURSE PRACTITIONER

## 2024-09-24 PROCEDURE — 99214 OFFICE O/P EST MOD 30 MIN: CPT

## 2024-09-24 PROCEDURE — 99213 OFFICE O/P EST LOW 20 MIN: CPT

## 2024-09-24 NOTE — ED INITIAL ASSESSMENT (HPI)
Presents with right knee pain for 3 days after \"overuse\" at her daughter's wedding over the weekend. No trauma. + swelling. Painful weight bearing. Some tingling down leg today. Taking Advil.

## 2024-09-24 NOTE — DISCHARGE INSTRUCTIONS
Ice and elevate the knee.  Wear the Ace wrap during the day for support and comfort.  Use the crutches when ambulatory.  Ibuprofen or Tylenol as needed for pain.  Call and make a close follow-up appointment with orthopedics.  Return for any concerns.

## 2024-09-24 NOTE — ED PROVIDER NOTES
He    Patient Seen in: [facility]      History     Chief Complaint   Patient presents with    Knee Pain     Stated Complaint: Rt knee pain  Subjective:   58-year-old healthy female presents for right knee pain that started a few days ago.  She states prior to the knee pain starting, she was helping with a family wedding, carrying things, and going up and down a lot of stairs.  She has a history of a meniscus tear in the other knee.  No history of any fracture or injury in the right knee.  Mild swelling.  The pain is mostly above the kneecap and to the medial aspect of the knee.  It is with flexion, extension, and ambulation.  She states she has a lot of difficulty going up and down stairs.  No calf redness, pain, or swelling.  No numbness or tingling.  The skin is intact.  No erythema or signs of infection.  She appears nontoxic.      Objective:   Past Medical History:    Adhesive capsulitis of right shoulder    History of pregnancy    ,, , 2007 SAB    Migraine    MVP (mitral valve prolapse)    Echocardiogram            Past Surgical History:   Procedure Laterality Date    Other surgical history      complete extraction of all teeth              Social History     Socioeconomic History    Marital status:    Tobacco Use    Smoking status: Former     Current packs/day: 0.00     Types: Cigarettes     Start date: 1986     Quit date: 1988     Years since quittin.7    Smokeless tobacco: Never    Tobacco comments:     socially only   Vaping Use    Vaping status: Never Used   Substance and Sexual Activity    Alcohol use: Yes     Alcohol/week: 0.0 standard drinks of alcohol     Comment: 1-2 drinks/week    Drug use: No   Other Topics Concern    Caffeine Concern No            Review of Systems    Positive for stated complaint: Knee Pain     Other systems are as noted in HPI.  Constitutional and vital signs reviewed.      All other systems reviewed and negative except as  noted above.    Physical Exam     ED Triage Vitals [09/24/24 0903]   /56   Pulse 63   Resp 16   Temp 98.5 °F (36.9 °C)   Temp src Temporal   SpO2 100 %   O2 Device None (Room air)     Current:/56   Pulse 63   Temp 98.5 °F (36.9 °C) (Temporal)   Resp 16   LMP  (LMP Unknown)   SpO2 100%     Physical Exam  Vitals and nursing note reviewed.   Constitutional:       General: She is not in acute distress.     Appearance: Normal appearance. She is not toxic-appearing.   HENT:      Mouth/Throat:      Mouth: Mucous membranes are moist.   Eyes:      Pupils: Pupils are equal, round, and reactive to light.   Cardiovascular:      Rate and Rhythm: Normal rate and regular rhythm.   Pulmonary:      Effort: Pulmonary effort is normal.      Breath sounds: Normal breath sounds.   Musculoskeletal:         General: Swelling and tenderness present. No deformity or signs of injury.      Right lower leg: No edema.      Left lower leg: No edema.      Comments: Pain to the right knee with flexion, extension, ambulation, and going up and down stairs.  Pain is to the medial aspect of the knee and above the kneecap.  Mild swelling is visible.  No erythema or signs of infection.  No calf redness, pain, or swelling.  She is ambulatory with a limp.   Skin:     General: Skin is warm and dry.      Capillary Refill: Capillary refill takes less than 2 seconds.      Findings: No bruising or erythema.   Neurological:      General: No focal deficit present.      Mental Status: She is alert and oriented to person, place, and time.      Cranial Nerves: No cranial nerve deficit.      Sensory: No sensory deficit.      Motor: No weakness.      Coordination: Coordination normal.   Psychiatric:         Mood and Affect: Mood normal.         Behavior: Behavior normal.         ED Course   XR KNEE (1 OR 2 VIEWS), RIGHT (CPT=73560)    Result Date: 9/24/2024  CONCLUSION:   No acute fracture or dislocation.  Minimal medial compartment osteoarthrosis.   Trace quadriceps tendon enthesophyte.     Dictated by (CST): Milad Lee MD on 9/24/2024 at 9:55 AM     Finalized by (CST): Milad Lee MD on 9/24/2024 at 9:56 AM         Labs Reviewed - No data to display    MDM     Medical Decision Making  The patient is aware of the x-ray results below.  An Ace wrap was applied to the right knee.  The patient was given crutches with instructions on use and reverse demonstration.  We discussed port of care including ice, elevation, and ibuprofen as needed for pain.  I will refer to an orthopod she has seen in the past.  She will call and make a close follow-up appointment.    Amount and/or Complexity of Data Reviewed  Radiology: ordered.     Details: I visualized the x-ray images which show osteoarthritis and a quadriceps tendon and then enthesophyte.  No acute fracture or dislocation.    Risk  OTC drugs.  Risk Details: Knee fracture versus sprain versus strain        Disposition and Plan     Clinical Impression:  1. Strain of right knee, initial encounter         Disposition:  Discharge  9/24/2024 10:06 am    Follow-up:  Vaughn Fofana MD  [facility] [address] [address]  [phone]    Call   to arrange a follow up appointment          Medications Prescribed:  Current Discharge Medication List

## 2024-10-02 ENCOUNTER — OFFICE VISIT (OUTPATIENT)
Dept: ORTHOPEDICS CLINIC | Facility: CLINIC | Age: 59
End: 2024-10-02
Payer: COMMERCIAL

## 2024-10-02 VITALS — HEIGHT: 63 IN | WEIGHT: 122 LBS | BODY MASS INDEX: 21.62 KG/M2

## 2024-10-02 DIAGNOSIS — S83.206A POSITIVE MCMURRAY TEST OF RIGHT KNEE, INITIAL ENCOUNTER: Primary | ICD-10-CM

## 2024-10-02 PROCEDURE — 99214 OFFICE O/P EST MOD 30 MIN: CPT | Performed by: PHYSICIAN ASSISTANT

## 2024-10-02 NOTE — H&P
[facility] - ORTHOPEDICS  [address] [place] [id number]  [phone]     NEW PATIENT VISIT - HISTORY AND PHYSICAL EXAMINATION     Name: Lucy Dietz   MRN: [id number]  Date: 10/2/2024     CC: Right knee pain.     REFERRED BY: Mia Mcadams MD    HPI:   Lucy Dexter is a very pleasant 58 year old female who presents today for evaluation, consultation, and management of right knee swelling and pain since 2024.  She describes her knee worsening with stairs, hills and lifting while she was setting up her daughter's wedding.  She complains of limited range of motion, swelling, stiffness, weakness and instability.  She works in healthcare as a . Pain is a 4/10 and notes less than 50% of normal function.       PMH:   Past Medical History:    Adhesive capsulitis of right shoulder    History of pregnancy    ,, ,  SAB    Migraine    MVP (mitral valve prolapse)    Echocardiogram       PAST SURGICAL HX:  Past Surgical History:   Procedure Laterality Date    Other surgical history      complete extraction of all teeth       FAMILY HX:  Family History   Problem Relation Age of Onset    Thyroid disease Daughter         Congenital hypothryoid    Breast Cancer Maternal Grandmother 65    Stroke Maternal Grandmother         Cerebral Vascular Accident     Heart Disorder Father        ALLERGIES:  Adhesive tape, Erythromycin, Pcn [penicillins], and Pine oil  [pine tar]    MEDICATIONS:   Current Outpatient Medications   Medication Sig Dispense Refill    methocarbamol 750 MG Oral Tab Take 1 tablet (750 mg total) by mouth 3 (three) times daily as needed. (Patient not taking: Reported on 2024) 30 tablet 0       ROS: A comprehensive 14 point review of systems was performed and was negative aside from the aforementioned per history of present illness.    SOCIAL HX:  Social History     Occupational History    Not on  file   Tobacco Use    Smoking status: Former     Current packs/day: 0.00     Types: Cigarettes     Start date: 1986     Quit date: 1988     Years since quittin.8    Smokeless tobacco: Never    Tobacco comments:     socially only   Vaping Use    Vaping status: Never Used   Substance and Sexual Activity    Alcohol use: Yes     Alcohol/week: 0.0 standard drinks of alcohol     Comment: 1-2 drinks/week    Drug use: No    Sexual activity: Not on file       PE:   Vitals:    10/02/24 0728   Weight: 122 lb (55.3 kg)   Height: 5' 3\" (1.6 m)     Estimated body mass index is 21.61 kg/m² as calculated from the following:    Height as of this encounter: 5' 3\" (1.6 m).    Weight as of this encounter: 122 lb (55.3 kg).    Physical Exam  Constitutional:       Appearance: Normal appearance.   HENT:      Head: Normocephalic and atraumatic.   Eyes:      Extraocular Movements: Extraocular movements intact.   Neck:      Musculoskeletal: Normal range of motion and neck supple.   Cardiovascular:      Pulses: Normal pulses.   Pulmonary:      Effort: Pulmonary effort is normal. No respiratory distress.   Abdominal:      General: There is no distension.   Skin:     General: Skin is warm.      Capillary Refill: Capillary refill takes less than 2 seconds.      Findings: No bruising.   Neurological:      General: No focal deficit present.      Mental Status: Alert.   Psychiatric:         Mood and Affect: Mood normal.     Examination of the right knee demonstrates:     Skin is intact, warm and dry.   Atrophy: none    Effusion: small    Joint line tenderness: medial  Crepitation: none   Kalina: Positive   Patellar mobility: normal without apprehension  J-sign: none    ROM: Extension full  Flexion 90 degrees  ACL:  Negative Lachman, Negative Pivot Shift   PCL:  Negative Posterior Drawer  Collateral Ligaments: Stable to Varus and Valgus stress at 0 and 30 degrees  Strength: normal   Hip joint: normal pain-free ROM   Gait:  mildly  antalgic   Leg length: equal and symmetric  Alignment:  neutral     No obvious peripheral edema noted.   Distal neurovascular exam demonstrates normal perfusion, intact sensation to light touch and full strength.     Examination of the contralateral knee demonstrates:  No significant atrophy, swelling or effusion. Full range of motion. Neurovascularly intact distally.    Radiographic Examination/Diagnostics:  I personally viewed, independently interpreted and radiology report was reviewed.      XR KNEE (1 OR 2 VIEWS), RIGHT (CPT=73560)    Result Date: 9/24/2024  PROCEDURE: XR KNEE (1 OR 2 VIEWS), RIGHT (CPT=73560)  COMPARISON: None.  INDICATIONS: Right knee pain post \"overuse\" 4 days ago. No specific trauma.  TECHNIQUE: Two views were obtained.   FINDINGS:  BONES: No acute fracture.  No dislocation.  Minimal medial compartment osteoarthrosis demonstrated by slight joint space narrowing and marginal osteophyte formation.  Trace quadriceps tendon enthesophyte. SOFT TISSUES: Negative. No visible soft tissue swelling. EFFUSION: None visible. OTHER: Negative.         CONCLUSION:   No acute fracture or dislocation.  Minimal medial compartment osteoarthrosis.  Trace quadriceps tendon enthesophyte.     Dictated by (CST): Milad Lee MD on 9/24/2024 at 9:55 AM     Finalized by (CST): Milad Lee MD on 9/24/2024 at 9:56 AM            IMPRESSION: Lucy Dexter is a 58 year old female who presents with right knee pain concerning for meniscal pathology.     PLAN:   We had a detailed discussion outlining the etiology, anatomy, pathophysiology, and natural history of the patient's findings. Imaging was reviewed in detail and correlated to a 3-dimensional model of the patient's pathology.     We reviewed the treatment of this disease condition.  In light of the acute traumatic incident, active locking/mechanical symptoms, loss of normal function, and  failure to progress conservatively we recommend an MRI to evaluate  the integrity of the patient's right knee meniscus. The patient will follow up after imaging.   Differential diagnosis includes but not limited to: cartilage injury/loose body, meniscus tear/injury, ACL tear, bone marrow edema, and osteoarthritis.     External records were also reviewed for pertinent historical findings contributing to the patients undiagnosed new problem with uncertain prognosis.     The patient had the opportunity to ask questions, and all questions were answered appropriately.         FOLLOW-UP:  Return to clinic following completion of MRI to review scan and findings.             [facility], PA-C Orthopedic Surgery / Sports Medicine Specialist  [facility] Orthopaedic Surgery  [address] [place] [address].org  [email or web address]  t: [phone]  o: [phone]  f: [phone]    This note was dictated using Dragon software.  While it was briefly proofread prior to completion, some grammatical, spelling, and word choice errors due to dictation may still occur.

## 2024-10-15 DIAGNOSIS — Z12.11 SCREENING FOR COLON CANCER: Primary | ICD-10-CM

## 2024-11-11 ENCOUNTER — OFFICE VISIT (OUTPATIENT)
Dept: FAMILY MEDICINE CLINIC | Facility: CLINIC | Age: 59
End: 2024-11-11
Payer: COMMERCIAL

## 2024-11-11 ENCOUNTER — HOSPITAL ENCOUNTER (OUTPATIENT)
Dept: GENERAL RADIOLOGY | Age: 59
Discharge: HOME | End: 2024-11-11
Attending: STUDENT IN AN ORGANIZED HEALTH CARE EDUCATION/TRAINING PROGRAM
Payer: COMMERCIAL

## 2024-11-11 ENCOUNTER — NURSE ONLY (OUTPATIENT)
Dept: LAB | Age: 59
End: 2024-11-11
Attending: NURSE PRACTITIONER
Payer: COMMERCIAL

## 2024-11-11 VITALS
BODY MASS INDEX: 22.5 KG/M2 | TEMPERATURE: 98 F | DIASTOLIC BLOOD PRESSURE: 82 MMHG | WEIGHT: 127 LBS | RESPIRATION RATE: 20 BRPM | SYSTOLIC BLOOD PRESSURE: 112 MMHG | OXYGEN SATURATION: 96 % | HEART RATE: 76 BPM | HEIGHT: 63 IN

## 2024-11-11 DIAGNOSIS — J06.9 VIRAL URI WITH COUGH: Primary | ICD-10-CM

## 2024-11-11 DIAGNOSIS — R09.89 CHEST CONGESTION: ICD-10-CM

## 2024-11-11 DIAGNOSIS — M54.2 NECK PAIN: ICD-10-CM

## 2024-11-11 DIAGNOSIS — M79.10 MUSCLE SORENESS: ICD-10-CM

## 2024-11-11 LAB
OPERATOR ID: NORMAL
POCT INFLUENZA A: NEGATIVE
POCT INFLUENZA B: NEGATIVE
RAPID SARS-COV-2 BY PCR: NOT DETECTED

## 2024-11-11 PROCEDURE — 72050 X-RAY EXAM NECK SPINE 4/5VWS: CPT | Performed by: STUDENT IN AN ORGANIZED HEALTH CARE EDUCATION/TRAINING PROGRAM

## 2024-11-11 PROCEDURE — 87502 INFLUENZA DNA AMP PROBE: CPT

## 2024-11-11 NOTE — PROGRESS NOTES
CHIEF COMPLAINT:     Chief Complaint   Patient presents with    Ear Problem     Ear pressure, headache, eye pressure since Friday of .          HPI:   Lucy Dexter is a 59 year old female who presents to clinic today with complaints of right ear pressure for 3 days.   Reports: mild headache, fever 100.0 at beginning, nausea, nasal dryness with some bleeding, neck and back achy.    Denies ear drainage, chills, sore throat.    Home treatment includes ibuprofen, Excedrin migraine, Flonase, HCR coricidin   .  No COVID exposure that she is aware of but she works at [employer].   She had Covid 3/2024      Current Outpatient Medications   Medication Sig Dispense Refill    methocarbamol 750 MG Oral Tab Take 1 tablet (750 mg total) by mouth 3 (three) times daily as needed. 30 tablet 0      Past Medical History:    Adhesive capsulitis of right shoulder    History of pregnancy    ,, ,  SAB    Migraine    MVP (mitral valve prolapse)    Echocardiogram      Social History:  Social History     Socioeconomic History    Marital status:    Tobacco Use    Smoking status: Former     Current packs/day: 0.00     Types: Cigarettes     Start date: 1986     Quit date: 1988     Years since quittin.9    Smokeless tobacco: Never    Tobacco comments:     socially only   Vaping Use    Vaping status: Never Used   Substance and Sexual Activity    Alcohol use: Yes     Alcohol/week: 0.0 standard drinks of alcohol     Comment: 1-2 drinks/week    Drug use: No   Other Topics Concern    Caffeine Concern No        REVIEW OF SYSTEMS:   GENERAL: Feeling well otherwise.    SKIN: no unusual skin lesions or rashes  HEENT: See HPI  LUNGS: No shortness of breath, or wheezing.  CARDIOVASCULAR: No chest pain, palpitations  GI: No N/V/C/D.  NEURO: mild headaches.  Denies dizziness    EXAM:   /82 (BP Location: Left arm, Patient Position: Sitting, Cuff Size: adult)   Pulse 76   Temp 98.2  °F (36.8 °C) (Oral)   Resp 20   Ht 5' 3\" (1.6 m)   Wt 127 lb (57.6 kg)   LMP  (LMP Unknown)   SpO2 96%   BMI 22.50 kg/m²   GENERAL: well developed, well nourished, in no apparent distress  SKIN: no rashes, no suspicious lesions  HEAD: atraumatic, normocephalic  EYES: conjunctiva clear  EARS: Tragus non tender on palpation bilaterally. External auditory canals healthy. No swelling, erythema, or tenderness to bilat mastoid area.    Right TM: clear, no bulging, no retraction, no effusion, bony landmarks visualized.   Left TM: clear, no bulging, no retraction, no effusion, bony landmarks visualized.  NOSE: nostrils patent, clear nasal discharge, nasal mucosa pink and noninflamed  THROAT: oral mucosa pink, moist. Posterior pharynx is not erythematous or injected. No exudates.  NECK: supple, non-tender  LUNGS: clear to auscultation bilaterally. Breathing is non labored.  CARDIO: RRR without murmur  EXTREMITIES: no cyanosis, clubbing or edema  LYMPH: No auricular lymphadenopathy; No cervical lymphadenopathy.      Results for orders placed or performed in visit on 11/11/24   Rapid Covid-19    Collection Time: 11/11/24 10:31 AM    Specimen: Nares   Result Value Ref Range    Rapid SARS-CoV-2 by PCR Not Detected Not Detected    POCT Lot Number [id number]     POCT Expiration Date 3/24/2026     POCT Procedure Control Control Valid Control Valid     ,123            ASSESSMENT AND PLAN:   Lucy Dexter is a 59 year old female who presents with:    ASSESSMENT:  Encounter Diagnoses   Name Primary?    Viral URI with cough Yes    Muscle soreness        PLAN:   POC Covid / Flu negative   She will continue her OTC Meds.  She feels comfortable using those. Tylenol/Motrin prn pain.    Risks, benefits, and side effects of medication explained and discussed.  Comfort measures as described in Patient Instructions.    Discussed S&S that require follow-up.   Work note given      Requested Prescriptions      No  prescriptions requested or ordered in this encounter           Patient Instructions   -OK to continue OTC   -Push fluids and plenty of rest    -OTC Tylenol/Ibuprofen as packet insert If no allergies  -Soothing cough drops as packet insert   -Good handwashing, to prevent spread of virus    -Face mask helps prevent viral infections    Follow up in 3-5 days for worsening symptoms with clinic or PCP       Patient voiced understanding and is in agreement with treatment plan

## 2024-11-11 NOTE — PATIENT INSTRUCTIONS
-OK to continue OTC   -Push fluids and plenty of rest    -OTC Tylenol/Ibuprofen as packet insert If no allergies  -Soothing cough drops as packet insert   -Good handwashing, to prevent spread of virus    -Face mask helps prevent viral infections    Follow up in 3-5 days for worsening symptoms with clinic or PCP

## 2024-11-13 ENCOUNTER — SPINE CENTER NAVIGATION (OUTPATIENT)
Age: 59
End: 2024-11-13

## 2024-11-13 NOTE — PROGRESS NOTES
Spine Center Referral Navigation Encounter Note    Referred by: Mia Mcadams MD    Imaging: XR Cervical Spine   If imaging done at an external facility, instructed patient to bring disc of MRI to appointment.     Previously Seen Spine Care Providers: None    Referred to: Denny Kinney MD in Physiatry     Information below is patient reported.      [facility]  Are you currently experiencing any of the following symptoms?      No    Is your condition due to an injury that occurred at work or is your care for this condition being coordinated by Worker's Compensation?      No    Are you looking for a second surgical opinion?      No    Have you had surgery on your spine (neck or back) in the last 12 months?      No    In the last several weeks, have you experienced weakness or balance issues that have caused falls or difficulty lifting your legs or feet (lower body) and/or weakness that has caused you to drop items or caused changes in handwriting (upper body)?      No    In the last 3 months, have you had spine injections AND physical therapy for your back or neck that did not improve your symptoms?      No    : Patient needs to be seen by non-surgical team. Does patient require a new visit or established visit?      New patient visit    Are you closer to [place] or [facility]?      [facility]        Patient requested [facility] location.

## 2024-11-17 ENCOUNTER — OFFICE VISIT (OUTPATIENT)
Dept: FAMILY MEDICINE CLINIC | Facility: CLINIC | Age: 59
End: 2024-11-17
Payer: COMMERCIAL

## 2024-11-17 VITALS
RESPIRATION RATE: 18 BRPM | HEART RATE: 79 BPM | OXYGEN SATURATION: 96 % | WEIGHT: 127 LBS | SYSTOLIC BLOOD PRESSURE: 116 MMHG | HEIGHT: 63 IN | BODY MASS INDEX: 22.5 KG/M2 | DIASTOLIC BLOOD PRESSURE: 72 MMHG | TEMPERATURE: 98 F

## 2024-11-17 DIAGNOSIS — J02.9 SORE THROAT: ICD-10-CM

## 2024-11-17 DIAGNOSIS — J06.9 VIRAL URI: Primary | ICD-10-CM

## 2024-11-17 DIAGNOSIS — B35.9 TINEA: ICD-10-CM

## 2024-11-17 LAB
CONTROL LINE PRESENT WITH A CLEAR BACKGROUND (YES/NO): YES YES/NO
KIT LOT #: NORMAL NUMERIC

## 2024-11-17 RX ORDER — TRIAMCINOLONE ACETONIDE 1 MG/G
CREAM TOPICAL 2 TIMES DAILY
Qty: 15 G | Refills: 0 | Status: SHIPPED | OUTPATIENT
Start: 2024-11-17 | End: 2024-11-24

## 2024-11-17 NOTE — PROGRESS NOTES
CHIEF COMPLAINT:     Chief Complaint   Patient presents with    Upper Respiratory Infection     X 1 week   Sx: sore throat, ear pressure causing headache     Rash     X 5 days  Sx: rash on naval        HPI:   Lucy Dexter is a 59 year old female who presents for upper respiratory symptoms for  7 days. Patient reports sore throat, congestion, OTC cold meds have not been helping, ear pressure, denies fever, denies sinus pain. Symptoms have been improving since onset.  Treating symptoms with medicinal honey.   Associated symptoms include rash of belly button.  Started treated with Lotrimin and calamine for itch, no relief of itch.  No known sick contacts.  Denies fevers.    Current Outpatient Medications   Medication Sig Dispense Refill    triamcinolone 0.1 % External Cream Apply topically 2 (two) times daily for 7 days. 15 g 0    methocarbamol 750 MG Oral Tab Take 1 tablet (750 mg total) by mouth 3 (three) times daily as needed. 30 tablet 0      Past Medical History:    Adhesive capsulitis of right shoulder    History of pregnancy    ,, ,  SAB    Migraine    MVP (mitral valve prolapse)    Echocardiogram      Past Surgical History:   Procedure Laterality Date    Other surgical history      complete extraction of all teeth         Social History     Socioeconomic History    Marital status:    Tobacco Use    Smoking status: Former     Current packs/day: 0.00     Types: Cigarettes     Start date: 1986     Quit date: 1988     Years since quittin.9    Smokeless tobacco: Never    Tobacco comments:     socially only   Vaping Use    Vaping status: Never Used   Substance and Sexual Activity    Alcohol use: Yes     Alcohol/week: 0.0 standard drinks of alcohol     Comment: 1-2 drinks/week    Drug use: No   Other Topics Concern    Caffeine Concern No         REVIEW OF SYSTEMS:   GENERAL: normal appetite  SKIN: no abnormal skin lesions, rash at umbilicus  HEENT: See HPI  LUNGS:  denies shortness of breath or wheezing, See HPI  CARDIOVASCULAR: denies chest pain or palpitations   GI: denies N/V/C or abdominal pain  NEURO: Denies headaches    EXAM:   /72   Pulse 79   Temp 97.8 °F (36.6 °C)   Resp 18   Ht 5' 3\" (1.6 m)   Wt 127 lb (57.6 kg)   LMP  (LMP Unknown)   SpO2 96%   BMI 22.50 kg/m²   GENERAL: well developed, well nourished,in no apparent distress  SKIN: erythematous and hyperpigmented patch to umbilicus  HEAD: atraumatic, normocephalic.  no tenderness on palpation of  sinuses  EYES: conjunctiva clear, EOM intact  EARS: TM's intact, no bulging, no retraction,clear bilat fluid, bony landmarks visualized  NOSE: Nostrils patent, no nasal discharge, nasal mucosa pink   THROAT: Oral mucosa pink, moist. Posterior pharynx is erythematous. no exudates. Tonsils 1/4. +PND    NECK: Supple, non-tender  LUNGS: clear to auscultation bilaterally, no wheezes or rhonchi. Breathing is non labored.  CARDIO: RRR without murmur  GI: active BS's x4,no masses, hepatosplenomegaly, or tenderness on direct palpation  EXTREMITIES: no cyanosis, clubbing or edema  LYMPH:  no lymphadenopathy.        ASSESSMENT AND PLAN:   Lucy Dexter is a 59 year old female who presents with upper respiratory symptoms that are consistent with    ASSESSMENT:   Encounter Diagnoses   Name Primary?    Sore throat     Viral URI Yes    Tinea        PLAN: RST negative. CPM of OTC with resolving viral infection. Meds as below for tinea. Continue lotrimin cream BID for 2-4 weeks or until the symptoms have completely resolved. May use rx below to help with itch. Discussed s/s of infection. Comfort care as described in Patient Instructions, cool compresses to help with itch.    Meds & Refills for this Visit:  Requested Prescriptions     Signed Prescriptions Disp Refills    triamcinolone 0.1 % External Cream 15 g 0     Sig: Apply topically 2 (two) times daily for 7 days.       Risks, benefits, and side effects of  medication explained and discussed.    Education attached.    The patient indicates understanding of these issues and agrees to the plan.  The patient is asked to return if sx's persist or worsen.

## 2024-12-04 ENCOUNTER — TELEPHONE (OUTPATIENT)
Dept: ORTHOPEDICS CLINIC | Facility: CLINIC | Age: 59
End: 2024-12-04

## 2024-12-04 NOTE — TELEPHONE ENCOUNTER
Patient sent a [employer] message stating she needs a letter for work that says  Please allow Lucy Sriram the option to sit stand as needed during her scheduled work hours  Until further notice.    This is based on continued pain/ swelling when staying in either position for extended period of time.

## 2024-12-06 ENCOUNTER — OFFICE VISIT (OUTPATIENT)
Dept: ORTHOPEDICS CLINIC | Facility: CLINIC | Age: 59
End: 2024-12-06
Payer: COMMERCIAL

## 2024-12-06 DIAGNOSIS — S83.206A POSITIVE MCMURRAY TEST OF RIGHT KNEE, INITIAL ENCOUNTER: ICD-10-CM

## 2024-12-06 DIAGNOSIS — M94.261 CHONDROMALACIA OF RIGHT KNEE: Primary | ICD-10-CM

## 2024-12-06 PROCEDURE — 99213 OFFICE O/P EST LOW 20 MIN: CPT | Performed by: PHYSICIAN ASSISTANT

## 2024-12-06 NOTE — PROGRESS NOTES
[facility] - ORTHOPEDICS  [address] [place] [id number]  [phone]       Name: Lucy Dietz   MRN: [id number]  Date: 12/6/2024     REASON FOR VISIT: Follow up for right knee pain.     INTERVAL HISTORY:  Lucy Dexter is a 59 year old female who returns for evaluation of right knee pain.     To summarize, right knee swelling and pain since September 22, 2024.  She describes her knee worsening with stairs, hills and lifting while she was setting up her daughter's wedding.  She complains of limited range of motion, swelling, stiffness, weakness and instability.  She works in healthcare as a . Pain is a 4/10 and notes less than 50% of normal function.     At the patient's last visit we recommended MRI.     ROS: ROS    PE:   There were no vitals filed for this visit.  Estimated body mass index is 22.5 kg/m² as calculated from the following:    Height as of 11/17/24: 5' 3\" (1.6 m).    Weight as of 11/17/24: 127 lb (57.6 kg).    Physical Exam  Constitutional:       Appearance: Normal appearance.   HENT:      Head: Normocephalic and atraumatic.   Eyes:      Extraocular Movements: Extraocular movements intact.   Neck:      Musculoskeletal: Normal range of motion and neck supple.   Cardiovascular:      Pulses: Normal pulses.   Pulmonary:      Effort: Pulmonary effort is normal. No respiratory distress.   Abdominal:      General: There is no distension.   Skin:     General: Skin is warm.      Capillary Refill: Capillary refill takes less than 2 seconds.      Findings: No bruising.   Neurological:      General: No focal deficit present.      Mental Status: She is alert.   Psychiatric:         Mood and Affect: Mood normal.     Examination of the right knee demonstrates:      Skin is intact, warm and dry.   Atrophy: none    Effusion: small    Joint line tenderness: medial  Crepitation: none   Kalina: Positive   Patellar mobility: normal  without apprehension  J-sign: none    ROM: Extension full  Flexion 90 degrees  ACL:  Negative Lachman, Negative Pivot Shift   PCL:  Negative Posterior Drawer  Collateral Ligaments: Stable to Varus and Valgus stress at 0 and 30 degrees  Strength: normal   Hip joint: normal pain-free ROM   Gait:  mildly antalgic   Leg length: equal and symmetric  Alignment:  neutral      No obvious peripheral edema noted.   Distal neurovascular exam demonstrates normal perfusion, intact sensation to light touch and full strength.      Examination of the contralateral knee demonstrates:  No significant atrophy, swelling or effusion. Full range of motion. Neurovascularly intact distally.    Radiographic Examination/Diagnostics:    I personally viewed, independently interpreted and radiology report was reviewed.    MRI Right Knee, 11/16/2024 (images available on patient's phone, report reviewed)   Impression: Large to a vertical tear at the junction of the body and posterior horn of the medial meniscus extending to the tibial articular surface.  Horizontal tearing posterior horn of the medial meniscus extending to the tibial articular surface.  Chondromalacia patella.  Focal grade III-IV chondromalacia of the lateral tibial condyle.  Moderate joint effusion.  Small semimembranous bursa.  XR KNEE (1 OR 2 VIEWS), RIGHT (CPT=73560)     Result Date: 9/24/2024  PROCEDURE:      XR KNEE (1 OR 2 VIEWS), RIGHT (CPT=73560)  COMPARISON:       None.  INDICATIONS:   Right knee pain post \"overuse\" 4 days ago. No specific trauma.  TECHNIQUE:        Two views were obtained.   FINDINGS:             BONES:               No acute fracture.  No dislocation.  Minimal medial compartment osteoarthrosis demonstrated by slight joint space narrowing and marginal osteophyte formation.  Trace quadriceps tendon enthesophyte. SOFT TISSUES:   Negative. No visible soft tissue swelling. EFFUSION:             None visible. OTHER:      Negative.          CONCLUSION:       No acute fracture or dislocation.  Minimal medial compartment osteoarthrosis.  Trace quadriceps tendon enthesophyte.     Dictated by (CST): Milad Lee MD on 9/24/2024 at 9:55 AM     Finalized by (CST): Milad Lee MD on 9/24/2024 at 9:56 AM          IMPRESSION: Lucy Dexter is a 59 year old female who presented for follow up of right knee medial meniscus tear with chondromalacia.     PLAN:   We had a detailed discussion outlining the etiology, anatomy, pathophysiology, and natural history of the patient's findings.    We reviewed the treatment of this disease condition.       We discussed operative versus nonoperative intervention.  She is improving with conservative measures and defers any surgical intervention today.  We will focus on physical therapy and we also discussed her interest in holistic approaches at today's visit.  She is supplementing with turmeric as well.      I spent 20 minutes in preparation to see the patient, counseling/education of relevant pathology, discussing imaging results, ordering therapy  intervention, care coordination, and documentation into the electronic medical record.      FOLLOW-UP:  Return to clinic in six weeks. No imaging required at next visit.             Aroldo Lloyd, [facility], PA-C Orthopedic Surgery / Sports Medicine Specialist  [facility]  [address] [place] [address].[email or web address]  [email or web address]  t: [phone]  o: [phone]  f: [phone]    This note was dictated using Dragon software.  While it was briefly proofread prior to completion, some grammatical, spelling, and word choice errors due to dictation may still occur.

## 2024-12-19 ENCOUNTER — PATIENT MESSAGE (OUTPATIENT)
Dept: FAMILY MEDICINE CLINIC | Facility: CLINIC | Age: 59
End: 2024-12-19

## 2024-12-19 DIAGNOSIS — M54.12 CERVICAL RADICULOPATHY: Primary | ICD-10-CM

## 2024-12-20 NOTE — TELEPHONE ENCOUNTER
Patient is requesting referral for physical therapy to [facility] for cervical spine issues.    Referral pended for your completion and approval.      Your neck XR shows some arthritic changes with straightening of the spine, which can contribute to pain and headaches. You also have mild space narrowing where the nerves exit, which can cause pain. You would benefit from physical therapy but could also follow-up with physiatry to discuss pain management. Please contact us with any questions.   Written by Mia Mcadams MD on 11/12/2024  6:08 PM CST  Seen by patient Lucy Dexter on 12/19/2024  8:23 AM

## 2025-01-08 ENCOUNTER — OFFICE VISIT (OUTPATIENT)
Dept: PHYSICAL MEDICINE AND REHAB | Facility: CLINIC | Age: 60
End: 2025-01-08
Payer: COMMERCIAL

## 2025-01-08 ENCOUNTER — TELEPHONE (OUTPATIENT)
Dept: PHYSICAL MEDICINE AND REHAB | Facility: CLINIC | Age: 60
End: 2025-01-08

## 2025-01-08 VITALS — HEIGHT: 63 IN | WEIGHT: 127 LBS | BODY MASS INDEX: 22.5 KG/M2

## 2025-01-08 DIAGNOSIS — M54.12 RADICULITIS OF RIGHT CERVICAL REGION: ICD-10-CM

## 2025-01-08 DIAGNOSIS — M79.18 CERVICAL MYOFASCIAL PAIN SYNDROME: ICD-10-CM

## 2025-01-08 DIAGNOSIS — S83.241D ACUTE MEDIAL MENISCAL TEAR, RIGHT, SUBSEQUENT ENCOUNTER: Primary | ICD-10-CM

## 2025-01-08 PROCEDURE — 99204 OFFICE O/P NEW MOD 45 MIN: CPT | Performed by: PHYSICAL MEDICINE & REHABILITATION

## 2025-01-08 NOTE — PATIENT INSTRUCTIONS
Can consider glucosamine/chondroitin sulfate for 1-2 months for the knee. Reputable brands are [employer] and [email or web address].

## 2025-01-08 NOTE — H&P
History and Physical    C/C:   Chief Complaint   Patient presents with    New Patient     New pt presenting with neck pain radiating N/T into R shoulder since 11/11/24. Denies injury. Pain 3/10. Denies weakness. Pt takes ibuprofen, Excedrin migraine as needed with no improvement. HEP with stretching, ice/heat, massages, and essential oils has helped. XR C spine 11/11/24.     HPI: 59-year-old female with history of osteoporosis, adhesive capsulitis of left shoulder referred through the spine center presents with neck pain at the base of the skull and down the paraspinal musculature to the base of the neck, and tingling in the right upper trapezial region. Stiffness with ROM, rotation towards the right. No weakness in the arm or hand. She has had chiropractic care, muscle relaxants, NSAIDs, massage. No injections or surgeries. Was previously recommended injection many years ago which she chose to defer. No history of cervical spine surgeries. Has had XR cervical spine. No MRI.     Also has right knee pain followed by orthopedics, found to have a right medial meniscal tear and was recommended physical therapy at a specific location that she is having difficulty scheduling due to length of commute. She is asking if there might be a closer location for her. She is also inquiring about viscosupplementation injection for the right knee.     Allergies: Allergies[1]     Patient-reported ROS  Constitutional  Sleep Disturbance: admits  Chills: denies  Fever: denies  Weight Gain: denies  Weight Loss: denies   Cardiovascular  Chest Pain: denies  Irregular Heartbeat: admits   Respiratory  Painful Breathing: denies  Wheezing: denies   Gastrointestinal  Bowel Incontinence: denies  Heartburn: denies  Abdominal Pain: denies  Blood in Stool : denies  Rectal Pain: denies   Hematology  Easy Bruising: denies  Easy Bleeding: denies   Genitourinary  Difficulty Urinating: denies  Bladder Incontinence: denies  Pelvic Pain: denies  Painful  Urination: denies   Musculoskeletal  Joint Stiffness: admits  Painful Joints: admits  Tailbone Pain: denies  Swollen Joints: admits   Peripheral Vascular  Swelling of Legs/Feet: admits  Cold Extremities: admits   Skin  Open Sores: denies  Nodules or Lumps: denies  Rash: denies   Neurological  Loss of Strength Since last Visit: denies  Tingling/Numbness: admits  Balance: denies   Psychiatric  Anxiety: denies  Depressed Mood: denies      Physical exam:  Ht 63\"   Wt 127 lb (57.6 kg)   LMP  (LMP Unknown)   BMI 22.50 kg/m²     Cervical spine exam:    No neck rash  No ttp cervical paraspinals right. Mild ttp upper trapezius right. Significant trapezial tightness.  Cervical extension 50 degrees. Cervical flexion 50 degrees. Cervical rotation to the right 60 degrees. Cervical rotation to the left 75 degrees, results in right sided and central neck pain.  Spurling's test results in bilateral neck pain  5/5 strength in shoulder abduction, elbow flexion, elbow extension, wrist extension, finger flexion, FDI bilaterally  SILT b/l UE C5-T1 distributions  2/4 biceps, brachioradialis, triceps reflexes b/l  Lopez test negative    Imaging: X-rays cervical spine dated 11/11/2024 independently reviewed, as was report.  Degenerative grade 1 spondylolisthesis of C5 and C6.  Mild bilateral foraminal narrowing C5-C6.    Assessment and plan:  1) right cervical radiculitis  2) cervical myofascial pain  3) right medial meniscal tear, degenerative  4) osteoporosis    Recommend physical therapy for soft tissue techniques, cervical stabilization, and right knee stabilization. We will try to avoid NSAIDs given history of osteoporosis. I placed an order for right knee durolane or monovisc injection under ultrasound guidance. May also consider glucosamine/chondroitin sulfate for 1-2 months.     Cuba Sidhu DO  Physical Medicine and Rehabilitation  [facility]       [1]   Allergies  Allergen Reactions    Adhesive Tape  ITCHING     blisters    Erythromycin PALPITATIONS     Blurred vision      Pcn [Penicillins] HIVES    Pine Oil  [Pine Tar] UNKNOWN

## 2025-01-08 NOTE — TELEPHONE ENCOUNTER
Initiated authorization for Right knee joint Durolane (Preferred HA per [employer]) injection under [place] guidance. CPT/HCPCS 37193,   dx:M17.11 with [employer]    [employer] HA PA form has been completed and faxed along with clinicals to [phone]   Status: Pending

## 2025-01-28 ENCOUNTER — TELEPHONE (OUTPATIENT)
Dept: FAMILY MEDICINE CLINIC | Facility: CLINIC | Age: 60
End: 2025-01-28

## 2025-01-28 NOTE — TELEPHONE ENCOUNTER
A FIT test kit was recently mailed to patient. They are due to complete this.   Detailed message left on patients voicemail.

## 2025-03-17 ENCOUNTER — OFFICE VISIT (OUTPATIENT)
Dept: FAMILY MEDICINE CLINIC | Facility: CLINIC | Age: 60
End: 2025-03-17
Payer: COMMERCIAL

## 2025-03-17 VITALS
OXYGEN SATURATION: 96 % | WEIGHT: 136 LBS | HEART RATE: 68 BPM | TEMPERATURE: 98 F | SYSTOLIC BLOOD PRESSURE: 115 MMHG | DIASTOLIC BLOOD PRESSURE: 76 MMHG | RESPIRATION RATE: 16 BRPM | HEIGHT: 63 IN | BODY MASS INDEX: 24.1 KG/M2

## 2025-03-17 DIAGNOSIS — Z12.31 ENCOUNTER FOR SCREENING MAMMOGRAM FOR MALIGNANT NEOPLASM OF BREAST: ICD-10-CM

## 2025-03-17 DIAGNOSIS — I49.3 PREMATURE VENTRICULAR CONTRACTIONS (PVCS) (VPCS): ICD-10-CM

## 2025-03-17 DIAGNOSIS — Z00.00 ROUTINE PHYSICAL EXAMINATION: Primary | ICD-10-CM

## 2025-03-17 DIAGNOSIS — Z78.0 MENOPAUSE: ICD-10-CM

## 2025-03-17 DIAGNOSIS — E55.9 VITAMIN D DEFICIENCY: ICD-10-CM

## 2025-03-17 DIAGNOSIS — M62.838 TRAPEZIUS MUSCLE SPASM: ICD-10-CM

## 2025-03-17 DIAGNOSIS — I05.9 MITRAL VALVE DISORDER: ICD-10-CM

## 2025-03-17 DIAGNOSIS — M79.605 LEFT LEG PAIN: ICD-10-CM

## 2025-03-17 DIAGNOSIS — I83.893 VARICOSE VEINS OF LEG WITH SWELLING, BILATERAL: ICD-10-CM

## 2025-03-17 DIAGNOSIS — Z12.11 SCREEN FOR COLON CANCER: ICD-10-CM

## 2025-03-17 PROCEDURE — 99396 PREV VISIT EST AGE 40-64: CPT

## 2025-03-17 PROCEDURE — 99213 OFFICE O/P EST LOW 20 MIN: CPT

## 2025-03-17 NOTE — PROGRESS NOTES
HPI:    Patient ID: Lucy Dexter is a 59 year old female.    HPI  Chief Complaint   Patient presents with    Physical     Annual px     Patient here in office for physical.  Last Pap completed in 2023 and was normal, HPV negative.  Mammogram completed in 2023 and was normal.  Occult  FIT testing ordered in October 2024, has not completed.     Complains of intermittent palpitations.  Has history of premature ventricular contractions and mitral valve disorder.  States she recently had to miss work 2 days last week due to palpitations with intermittent lightheadedness, symptoms have since resolved.  Last seen by cardiology in 2021.  Requesting referral to follow-up with cardiology.  Also needs note for work for absence last week.      Complains of bilateral lower leg swelling, has varicose veins.  Notices swelling worsens when standing/sitting for long period of time.  Reports tenderness in left posterior thigh, unsure if related to poor circulation.    Also has history of migraines and has been having right-sided intermittent headaches with burning sensation in trapezius area.  Takes methocarbamol and Excedrin Migraine with moderate improvement.  Reports she had headache almost every day last week, has since resolved.      Review of Systems   Constitutional: Negative.  Negative for fever.   HENT: Negative.  Negative for ear pain and sore throat.    Eyes: Negative.  Negative for visual disturbance.   Respiratory: Negative.  Negative for cough and shortness of breath.    Cardiovascular:  Positive for palpitations and leg swelling. Negative for chest pain.   Gastrointestinal: Negative.  Negative for abdominal pain, blood in stool, constipation and diarrhea.   Genitourinary: Negative.  Negative for dysuria, frequency and hematuria.   Musculoskeletal:  Positive for neck pain. Negative for arthralgias, back pain and myalgias.   Skin: Negative.  Negative for rash.   Neurological:  Positive for light-headedness and  headaches. Negative for dizziness and facial asymmetry.   Psychiatric/Behavioral: Negative.         /76   Pulse 68   Temp 97.5 °F (36.4 °C) (Temporal)   Resp 16   Ht 5' 3\" (1.6 m)   Wt 136 lb (61.7 kg)   LMP  (LMP Unknown)   SpO2 96%   BMI 24.09 kg/m²     Past Medical History:    Adhesive capsulitis of right shoulder    History of pregnancy    ,, , 2007 SAB    Migraine    MVP (mitral valve prolapse)    Echocardiogram     Past Surgical History:   Procedure Laterality Date    Other surgical history      complete extraction of all teeth     Social History     Socioeconomic History    Marital status:      Spouse name: Not on file    Number of children: Not on file    Years of education: Not on file    Highest education level: Not on file   Occupational History    Not on file   Tobacco Use    Smoking status: Former     Current packs/day: 0.00     Types: Cigarettes     Start date: 1986     Quit date: 1988     Years since quittin.2    Smokeless tobacco: Never    Tobacco comments:     socially only   Vaping Use    Vaping status: Never Used   Substance and Sexual Activity    Alcohol use: Yes     Alcohol/week: 0.0 standard drinks of alcohol     Comment: 1-2 drinks/week    Drug use: No    Sexual activity: Not on file   Other Topics Concern     Service Not Asked    Blood Transfusions Not Asked    Caffeine Concern No    Occupational Exposure Not Asked    Hobby Hazards Not Asked    Sleep Concern Not Asked    Stress Concern Not Asked    Weight Concern Not Asked    Special Diet Not Asked    Back Care Not Asked    Exercise Not Asked    Bike Helmet Not Asked    Seat Belt Not Asked    Self-Exams Not Asked   Social History Narrative    Not on file     Social Drivers of Health     Food Insecurity: Not on file   Transportation Needs: Not on file   Stress: Not on file   Housing Stability: Not on file     Family History   Problem Relation Age of Onset    Thyroid disease  Daughter         Congenital hypothryoid    Breast Cancer Maternal Grandmother 65    Stroke Maternal Grandmother         Cerebral Vascular Accident     Heart Disorder Father        Immunization History   Administered Date(s) Administered    Covid-19 Vaccine Pfizer Nakul-Sucrose 30 mcg/0.3 ml 01/09/2022, 01/30/2022    HEP B 06/25/2009    Kenalog Per 10mg Inj 05/23/2011    MMR 06/25/2009    TDAP 10/06/2016       Health Maintenance   Topic Date Due    Pneumococcal Vaccine: 50+ Years (1 of 2 - PCV) Never done    Zoster Vaccines (1 of 2) Never done    COVID-19 Vaccine (3 - 2024-25 season) 09/01/2024    Annual Physical  10/26/2024    Mammogram  10/27/2024    Colorectal Cancer Screening  10/28/2024    Influenza Vaccine (1) 10/01/2025 (Originally 10/1/2024)    DTaP,Tdap,and Td Vaccines (2 - Td or Tdap) 10/06/2026    Pap Smear  10/26/2026    Annual Depression Screening  Completed    Meningococcal B Vaccine  Aged Out          No current outpatient medications on file.     Allergies:Allergies[1]   PHYSICAL EXAM:     Chief Complaint   Patient presents with    Physical     Annual px      Physical Exam  Vitals reviewed.   Constitutional:       General: She is not in acute distress.     Appearance: Normal appearance. She is well-developed. She is not ill-appearing.   HENT:      Head: Normocephalic and atraumatic.      Right Ear: Tympanic membrane, ear canal and external ear normal. There is no impacted cerumen.      Left Ear: Tympanic membrane, ear canal and external ear normal. There is no impacted cerumen.      Nose: Nose normal. No congestion.      Mouth/Throat:      Mouth: Mucous membranes are moist.      Pharynx: Oropharynx is clear. No oropharyngeal exudate or posterior oropharyngeal erythema.   Eyes:      General:         Right eye: No discharge.         Left eye: No discharge.      Extraocular Movements: Extraocular movements intact.      Conjunctiva/sclera: Conjunctivae normal.      Pupils: Pupils are equal, round, and  reactive to light.   Cardiovascular:      Rate and Rhythm: Normal rate and regular rhythm.      Pulses:           Dorsalis pedis pulses are 2+ on the right side and 2+ on the left side.      Heart sounds: Normal heart sounds. No murmur heard.     No friction rub. No gallop.      Comments: Diffused varicose veins bilateral legs   Pulmonary:      Effort: Pulmonary effort is normal. No respiratory distress.      Breath sounds: Normal breath sounds. No stridor. No wheezing, rhonchi or rales.   Chest:      Chest wall: No tenderness.   Abdominal:      General: Bowel sounds are normal. There is no distension.      Palpations: Abdomen is soft. There is no mass.      Tenderness: There is no abdominal tenderness. There is no right CVA tenderness, left CVA tenderness, guarding or rebound.      Hernia: No hernia is present.   Genitourinary:     General: Normal vulva.      Vagina: Normal.   Musculoskeletal:         General: No tenderness. Normal range of motion.      Cervical back: Normal range of motion and neck supple.      Right lower le+ Pitting Edema present.      Left lower le+ Pitting Edema present.   Lymphadenopathy:      Cervical: No cervical adenopathy.   Skin:     General: Skin is warm and dry.      Findings: No rash.   Neurological:      General: No focal deficit present.      Mental Status: She is alert and oriented to person, place, and time.      Cranial Nerves: No cranial nerve deficit.      Sensory: No sensory deficit.      Motor: No weakness.      Deep Tendon Reflexes: Reflexes are normal and symmetric.      Comments: CN II-CN XII grossly intact   Psychiatric:         Mood and Affect: Mood normal.         Behavior: Behavior normal.         Thought Content: Thought content normal.         Judgment: Judgment normal.                ASSESSMENT/PLAN:     Return yearly for physicals  Follow up with dentist every 6 months  Follow up with eye doctor yearly  Recommend aerobic exercise for at least 30mins 5 days  a week  Yearly flu shot  Tetanus booster every 10 years (Tdap/ Td)  Labs ordered/ or reviewed if done prior to appointment     Encounter Diagnoses   Name Primary?    Routine physical examination Yes    Encounter for screening mammogram for malignant neoplasm of breast     Vitamin D deficiency     Screen for colon cancer     Trapezius muscle spasm     Left leg pain     Varicose veins of leg with swelling, bilateral     Premature ventricular contractions (PVCs) (VPCs)     Menopause     Mitral valve disorder        1. Routine physical examination  - CBC With Differential With Platelet  - Comp Metabolic Panel (14)  - Lipid Panel  - TSH and Free T4 [E]; Future  - Free T3 (Triiodothryronine)  - TSH and Free T4 [E]    2. Encounter for screening mammogram for malignant neoplasm of breast  - [facility] BAY 2D+3D SCREENING BILAT (CPT=77067/74068); Future    3. Vitamin D deficiency  - VITAMIN D, SCREEN [88937][Q]    4. Screen for colon cancer  - Occult Blood, Fecal, FIT Immunoassay    5. Trapezius muscle spasm  - Handout given on trapezius stretches. Advised patient to watch area for possible rash/shingles due to burning like pain in area.     6. Left leg pain  - Suspect related to varicose veins vs other  - Instructed patient to purchase thigh-high compression stockings over-the-counter, wear during the day and remove at night    7. Varicose veins of leg with swelling, bilateral  - Vascular Surgery - In Network    8. Premature ventricular contractions (PVCs) (VPCs)  -Go to ER for worsening palpitations with chest pain, lightheadedness, dizziness, and shortness of breath  - [facility] CARDIOLOGY EXTERNAL    9. Menopause  - VITAMIN D, SCREEN [49667][Q]    10. Mitral valve disorder  - [facility] CARDIOLOGY EXTERNAL      Orders Placed This Encounter   Procedures    CBC With Differential With Platelet    Comp Metabolic Panel (14)    Lipid Panel    Occult Blood, Fecal, FIT Immunoassay    TSH and Free T4 [E]    Free T3  (Triiodothryronine)    VITAMIN D, SCREEN [38357][Q]       The above note was creating using Dragon speech recognition technology. Please excuse any typos    Meds This Visit:  Requested Prescriptions      No prescriptions requested or ordered in this encounter       Imaging & Referrals:  VASCULAR SURGERY - INTERNAL  [facility] CARDIOLOGY EXTERNAL  [facility] BAY 2D+3D SCREENING BILAT (CPT=77067/77428)         DIANNE Pro         [1]   Allergies  Allergen Reactions    Adhesive Tape ITCHING     blisters    Erythromycin PALPITATIONS     Blurred vision      Pcn [Penicillins] HIVES    Pine Oil  [Pine Tar] UNKNOWN

## 2025-03-18 ENCOUNTER — PATIENT MESSAGE (OUTPATIENT)
Dept: FAMILY MEDICINE CLINIC | Facility: CLINIC | Age: 60
End: 2025-03-18

## 2025-03-18 ENCOUNTER — TELEPHONE (OUTPATIENT)
Dept: FAMILY MEDICINE CLINIC | Facility: CLINIC | Age: 60
End: 2025-03-18

## 2025-03-18 NOTE — TELEPHONE ENCOUNTER
Citlalli Samson ==see below, revised letter pended (see communication tab ). Sent as high priority     Per patient , Citlalli Samson saw her yesterday and provided a note for her absence with the wrong date. She missed work on 3/14/25 and 3/13/25 (not 3/11/25). She is returning to work today and is requesting to expedite the letter. She is active on [employer].

## 2025-03-27 ENCOUNTER — PATIENT MESSAGE (OUTPATIENT)
Dept: FAMILY MEDICINE CLINIC | Facility: CLINIC | Age: 60
End: 2025-03-27

## 2025-03-27 LAB
ABSOLUTE BASOPHILS: 29 CELLS/UL (ref 0–200)
ABSOLUTE EOSINOPHILS: 82 CELLS/UL (ref 15–500)
ABSOLUTE LYMPHOCYTES: 1694 CELLS/UL (ref 850–3900)
ABSOLUTE MONOCYTES: 509 CELLS/UL (ref 200–950)
ABSOLUTE NEUTROPHILS: 2486 CELLS/UL (ref 1500–7800)
ALBUMIN/GLOBULIN RATIO: 1.6 (CALC) (ref 1–2.5)
ALBUMIN: 4.2 G/DL (ref 3.6–5.1)
ALKALINE PHOSPHATASE: 96 U/L (ref 37–153)
ALT: 12 U/L (ref 6–29)
AST: 21 U/L (ref 10–35)
BASOPHILS: 0.6 %
BILIRUBIN, TOTAL: 1.1 MG/DL (ref 0.2–1.2)
BUN: 20 MG/DL (ref 7–25)
CALCIUM: 9.9 MG/DL (ref 8.6–10.4)
CARBON DIOXIDE: 30 MMOL/L (ref 20–32)
CHLORIDE: 106 MMOL/L (ref 98–110)
CHOL/HDLC RATIO: 3.9 (CALC)
CHOLESTEROL, TOTAL: 217 MG/DL
CREATININE: 0.62 MG/DL (ref 0.5–1.05)
EGFR: 102 ML/MIN/1.73M2
EOSINOPHILS: 1.7 %
GLOBULIN: 2.7 G/DL (CALC) (ref 1.9–3.7)
GLUCOSE: 92 MG/DL (ref 65–99)
HDL CHOLESTEROL: 56 MG/DL
HEMATOCRIT: 45 % (ref 35–45)
HEMOGLOBIN: 14.4 G/DL (ref 11.7–15.5)
LDL-CHOLESTEROL: 135 MG/DL (CALC)
LYMPHOCYTES: 35.3 %
MCH: 30.6 PG (ref 27–33)
MCHC: 32 G/DL (ref 32–36)
MCV: 95.7 FL (ref 80–100)
MONOCYTES: 10.6 %
MPV: 10.3 FL (ref 7.5–12.5)
NEUTROPHILS: 51.8 %
NON-HDL CHOLESTEROL: 161 MG/DL (CALC)
PLATELET COUNT: 217 THOUSAND/UL (ref 140–400)
POTASSIUM: 4.8 MMOL/L (ref 3.5–5.3)
PROTEIN, TOTAL: 6.9 G/DL (ref 6.1–8.1)
RDW: 11.8 % (ref 11–15)
RED BLOOD CELL COUNT: 4.7 MILLION/UL (ref 3.8–5.1)
SODIUM: 143 MMOL/L (ref 135–146)
T3, FREE: 3.4 PG/ML (ref 2.3–4.2)
T4, FREE: 1.2 NG/DL (ref 0.8–1.8)
TRIGLYCERIDES: 136 MG/DL
TSH: 3.26 MIU/L (ref 0.4–4.5)
VITAMIN D, 25-OH, TOTAL: 36 NG/ML (ref 30–100)
WHITE BLOOD CELL COUNT: 4.8 THOUSAND/UL (ref 3.8–10.8)

## 2025-04-24 ENCOUNTER — HOSPITAL ENCOUNTER (OUTPATIENT)
Dept: MAMMOGRAPHY | Age: 60
Discharge: HOME | End: 2025-04-24
Payer: COMMERCIAL

## 2025-04-24 DIAGNOSIS — Z12.31 ENCOUNTER FOR SCREENING MAMMOGRAM FOR MALIGNANT NEOPLASM OF BREAST: ICD-10-CM

## 2025-04-24 PROCEDURE — 77067 SCR MAMMO BI INCL CAD: CPT

## 2025-04-24 PROCEDURE — 77063 BREAST TOMOSYNTHESIS BI: CPT

## 2025-05-07 ENCOUNTER — TELEPHONE (OUTPATIENT)
Dept: FAMILY MEDICINE CLINIC | Facility: CLINIC | Age: 60
End: 2025-05-07

## 2025-05-07 NOTE — TELEPHONE ENCOUNTER
FYI ONLY     Patient declined additional views from mammogram. [facility] just wanted to inform ayaz fenton

## 2025-06-05 ENCOUNTER — OFFICE VISIT (OUTPATIENT)
Dept: FAMILY MEDICINE CLINIC | Facility: CLINIC | Age: 60
End: 2025-06-05
Payer: COMMERCIAL

## 2025-06-05 VITALS
DIASTOLIC BLOOD PRESSURE: 72 MMHG | RESPIRATION RATE: 16 BRPM | TEMPERATURE: 98 F | WEIGHT: 139 LBS | SYSTOLIC BLOOD PRESSURE: 120 MMHG | BODY MASS INDEX: 25 KG/M2 | HEART RATE: 62 BPM | OXYGEN SATURATION: 98 %

## 2025-06-05 DIAGNOSIS — R39.9 SYMPTOMS OF URINARY TRACT INFECTION: Primary | ICD-10-CM

## 2025-06-05 LAB
APPEARANCE: CLEAR
BILIRUBIN: NEGATIVE
GLUCOSE (URINE DIPSTICK): NEGATIVE MG/DL
KETONES (URINE DIPSTICK): NEGATIVE MG/DL
LEUKOCYTES: NEGATIVE
MULTISTIX LOT#: NORMAL NUMERIC
NITRITE, URINE: NEGATIVE
OCCULT BLOOD: NEGATIVE
PH, URINE: 6 (ref 4.5–8)
PROTEIN (URINE DIPSTICK): NEGATIVE MG/DL
SPECIFIC GRAVITY: 1.01 (ref 1–1.03)
URINE-COLOR: YELLOW
UROBILINOGEN,SEMI-QN: 0.2 MG/DL (ref 0–1.9)

## 2025-06-05 PROCEDURE — 81003 URINALYSIS AUTO W/O SCOPE: CPT | Performed by: NURSE PRACTITIONER

## 2025-06-05 PROCEDURE — 87086 URINE CULTURE/COLONY COUNT: CPT | Performed by: NURSE PRACTITIONER

## 2025-06-05 PROCEDURE — 99213 OFFICE O/P EST LOW 20 MIN: CPT | Performed by: NURSE PRACTITIONER

## 2025-06-05 RX ORDER — NITROFURANTOIN 25; 75 MG/1; MG/1
100 CAPSULE ORAL 2 TIMES DAILY
Qty: 10 CAPSULE | Refills: 0 | Status: SHIPPED | OUTPATIENT
Start: 2025-06-05 | End: 2025-06-10

## 2025-06-05 NOTE — PATIENT INSTRUCTIONS
Start Macrobid twice daily for up to five days. If negative for urinary culture you may take three days of medication if symptoms are improved.  Continue to hydrate well. Avoid Motrin if able.  May use warm pack to abdomen if desired to help with pain/pressure.  Follow up with primary care if symptoms persist or seek urgent care for worsening symptoms.

## 2025-06-05 NOTE — PROGRESS NOTES
Lucy Dexter is a 59 year old female.  HPI:   Patient presents with urinary symptoms. Complaining of urinary frequency, urgency, dysuria, suprapubic pressure, mild thoracic back pain. Denies flank pain, fever, hematuria.  Duration: 2 days  Sexual activity: with partner in the past week  Vaginal discharge: denies  Treatments tried: water intake      Current Medications[1]   Past Medical History[2]   Social History:  Short Social Hx on File[3]      REVIEW OF SYSTEMS:   GENERAL HEALTH: feels well otherwise  SKIN: denies any unusual skin lesions or rashes  RESPIRATORY: denies shortness of breath with exertion  CARDIOVASCULAR: denies chest pain on exertion  GI: denies nausea or vomiting  : denies hematuria  NEURO: denies headaches  MUSCULOSKELETAL:  denies CVA tenderness, notes thoracic spasm, using ice for comfort    EXAM:   /72   Pulse 62   Temp 98.4 °F (36.9 °C) (Oral)   Resp 16   Wt 139 lb (63 kg)   LMP  (LMP Unknown)   SpO2 98%   BMI 24.62 kg/m²   GENERAL: well developed, well nourished,in no apparent distress  CARDIOVASCULAR:  Normal rate, normal S1, S2  SKIN: no rashes,no suspicious lesions  MUSCULOSKELETAL:  No CVA tenderness  GI/:  abd soft, bladder soft and nondistended. no suprapubic tenderness  PSYCHIATRIC:  Alert and oriented x 3.  Results for orders placed or performed in visit on 06/05/25   URINALYSIS, AUTO, W/O SCOPE    Collection Time: 06/05/25  6:09 PM   Result Value Ref Range    Glucose Urine Negative Negative mg/dL    Bilirubin Urine Negative Negative    Ketones, UA Negative Negative - Trace mg/dL    Spec Gravity 1.010 1.005 - 1.030    Blood Urine Negative Negative    PH Urine 6.0 5.0 - 8.0    Protein Urine Negative Negative - Trace mg/dL    Urobilinogen Urine 0.2 0.2 - 1.0 mg/dL    Nitrite Urine Negative Negative    Leukocyte Esterase Urine Negative Negative    APPEARANCE clear Clear    Color Urine Yellow Yellow    Multistix Lot# [id number] Numeric    Multistix Expiration Date  25 Date         ASSESSMENT AND PLAN:   Lucy was seen today for urinary symptoms.    Diagnoses and all orders for this visit:    Symptoms of urinary tract infection  -     Urine Culture, Routine  -     URINALYSIS, AUTO, W/O SCOPE  -     nitrofurantoin monohydrate macro 100 MG Oral Cap; Take 1 capsule (100 mg total) by mouth 2 (two) times daily for 5 days.       Urine sent for culture and sensitivity will adjust antibiotic if needed. Verbal and written instructions given for patient care.   Patient will follow up with primary care if not improving and seek immediate care for worsening symptoms.  Patient verbalized understanding and agrees to plan.                [1]   Current Outpatient Medications   Medication Sig Dispense Refill    nitrofurantoin monohydrate macro 100 MG Oral Cap Take 1 capsule (100 mg total) by mouth 2 (two) times daily for 5 days. 10 capsule 0   [2]   Past Medical History:   Adhesive capsulitis of right shoulder    History of pregnancy    ,, ,  SAB    Migraine    MVP (mitral valve prolapse)    Echocardiogram   [3]   Social History  Socioeconomic History    Marital status:    Tobacco Use    Smoking status: Former     Current packs/day: 0.00     Types: Cigarettes     Start date: 1986     Quit date: 1988     Years since quittin.4    Smokeless tobacco: Never    Tobacco comments:     socially only   Vaping Use    Vaping status: Never Used   Substance and Sexual Activity    Alcohol use: Yes     Alcohol/week: 0.0 standard drinks of alcohol     Comment: 1-2 drinks/week    Drug use: No   Other Topics Concern    Caffeine Concern No

## 2025-08-25 ENCOUNTER — HOSPITAL ENCOUNTER (OUTPATIENT)
Age: 60
Discharge: HOME | End: 2025-08-25

## 2025-08-25 VITALS
TEMPERATURE: 98 F | SYSTOLIC BLOOD PRESSURE: 125 MMHG | HEART RATE: 49 BPM | DIASTOLIC BLOOD PRESSURE: 58 MMHG | RESPIRATION RATE: 18 BRPM | OXYGEN SATURATION: 100 %

## 2025-08-25 DIAGNOSIS — L08.9 INFECTED INSECT BITE OF LEFT ANKLE, INITIAL ENCOUNTER: Primary | ICD-10-CM
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
## 2025-08-25 DIAGNOSIS — S90.562A INFECTED INSECT BITE OF LEFT ANKLE, INITIAL ENCOUNTER: Primary | ICD-10-CM

## 2025-08-25 PROCEDURE — 99213 OFFICE O/P EST LOW 20 MIN: CPT | Performed by: PHYSICIAN ASSISTANT

## 2025-08-25 RX ORDER — CEPHALEXIN 500 MG/1
500 CAPSULE ORAL 4 TIMES DAILY
Qty: 40 CAPSULE | Refills: 0 | Status: SHIPPED | OUTPATIENT
Start: 2025-08-25 | End: 2025-09-04

## 2025-08-25 RX ORDER — MUPIROCIN 2 %
1 OINTMENT (GRAM) TOPICAL 3 TIMES DAILY
Qty: 1 EACH | Refills: 0 | Status: SHIPPED | OUTPATIENT
Start: 2025-08-25 | End: 2025-09-08

## (undated) DIAGNOSIS — R53.83 OTHER FATIGUE: Primary | ICD-10-CM

## (undated) NOTE — LETTER
2/2/2023          To Whom It May Concern:    Veronica Bender is currently under my medical care and was seen today on 2/3/23. Please excuse her for 2/3/23 and 2/4/23. Given the pts current medical condition, it is recommended that she use a standing desk throughout the day. If you require additional information please contact our office.         Sincerely,    Kyler Scherer PA-C            Document generated by:  Kyler Scherer PA-C

## (undated) NOTE — LETTER
06/06/19        Jose Angel Brennan  [address] [place] [address],[address] [address]      Dear [place],    [address] records indicate that you have outstanding lab work and or testing that was ordered for you and has not yet been completed:  Orders Placed This Encounter      ROGERIO

## (undated) NOTE — LETTER
Date & Time: 3/23/2022, 2:10 PM  Patient: Giulia Nunez  Encounter Provider(s):    DIANNE Stern       To Whom It May Concern:    Giulia Nunez was seen and treated in our department on 3/23/2022. She should not return to work until 3/26/22.     If you have any questions or concerns, please do not hesitate to call.        _____________________________  Physician/APC Signature

## (undated) NOTE — ED AVS SNAPSHOT
[facility] in [address] [address] [address]    Phone:  [phone]    Fax:  [phone]           Lucía Yanez   MRN: [id number]    Department:  [facility] in [address] [place] [address]   Date of Visit medications as prescribed. Follow-up with your doctor in 1-2 days. For any worsening symptoms, go to the emergency department for further evaluation.     Discharge References/Attachments     ACUTE BRONCHITIS, WHAT IS (ENGLISH)    BRONCHITIS, ANTIOBIOTIC [facility]. Follow-up care is at the discretion of that Physician.   If you need additional assistance selecting a physician, you may call the [place] [address] Physician Referral and Class Registration line at [phone] or f [facility] [address] ([facility]) [address] [facility] [address] [address]  [place] [address]) [phone]                Additional Information       We are concerned for your overall well being:    - If you are a smoker or

## (undated) NOTE — LETTER
Date: 6/5/2025    Patient Name: Lucy Rodriguez Dexter          To Whom it may concern:    The above patient was seen at [facility] for treatment of a medical condition.    This patient should be excused from attending work through 6/6/25.    The patient may return to work on or around 6/7/25 with no limitations.        Sincerely,        DIANNE Singh

## (undated) NOTE — LETTER
2/20/2019          To Whom It May Concern:    Viviane Blizzard is currently under my medical care and may return to work at this time.     Activity is restricted as follows: Light duty with no lifting greater than 5 pounds, no hazardous or fast-moving ma

## (undated) NOTE — LETTER
7/30/2020              [address] [address]       To Whom It May Concern,     Mario1 Chai Karimi is currently under my care. Please allow her to return to work.    She is currently getting treatment for he

## (undated) NOTE — LETTER
2/2/2023          To Whom It May Concern:    Jordan Olmedo is currently under my medical care and was seen today on 2/2/23. Please excuse her for 2/2/23 and 2/3/23. Given the pts current medical condition, she would benefit from a standing desk at work. If you require additional information please contact our office.         Sincerely,    Torey Hubbard PA-C            Document generated by:  INOCENCIA SingletonC

## (undated) NOTE — LETTER
5/2/2019          To Whom It May Concern:    Julia Mcclendon is currently under my medical care and may not return to work at this time. Please excuse Ute Fuentes for 1 days. She may return to work on 5/3/2019. Activity is restricted as follows: none.

## (undated) NOTE — LETTER
Date & Time: 9/24/2024, 10:06 AM  Patient: Lucy Dexter  Encounter Provider(s):    Dejan Brink APRN       To Whom It May Concern:    Lucy Dexter was seen and treated in our department on 9/24/2024.  She cannot return to work until cleared by orthopedics.  If you have any questions or concerns, please do not hesitate to call.        _____________________________  Physician/APC Signature

## (undated) NOTE — LETTER
3/17/2025          To Whom It May Concern:    Lucy Dexter is currently under my medical care and may return to work at this time.    Please excuse Lucy for 2 days (3/11/25 and 3/13/25)  Activity is restricted as follows: none.    If you require additional information please contact our office.        Sincerely,      DIANNE Pro          Document generated by:  DIANNE Pro

## (undated) NOTE — LETTER
Date & Time: 2/17/2019, 12:30 PM  Patient: Bianka Riley  Encounter Provider(s):    DIANNE Loredo       To Whom It May Concern:    Bianka Riley was seen and treated in our department on 2/17/2019. She may return to work on 02/21/19 .

## (undated) NOTE — LETTER
Date: 11/11/2024    Patient Name: Lucy Dexter          To Whom it may concern:    This letter has been written at the patient's request. The above patient was seen at [facility] for treatment of a medical condition.    This patient should be excused from attending work from 11/11/2024 through 11/12/2024.    The patient may return to work on 11/13/2024 if fever free.        Sincerely,           DIANNE George

## (undated) NOTE — LETTER
4/13/2019              Fior Young        [address] [address]          To whom it may concern,  The above patient was seen and examined today. She is cleared for her dental procedure.   She is aware to hold off of the meloxic

## (undated) NOTE — LETTER
3/18/2025          To Whom It May Concern:    Lucy Dexter is currently under my medical care . .    Please excuse Lucy for 2 days. ( 3/13/25  and 3/14/25 )  She may return to work on 3/18/25.  Activity is restricted as follows: none.    If you require additional information please contact our office.          Sincerely,        Citlalli Samson,DIANNE   [phone]           Document generated by:  Elke KAUR RN

## (undated) NOTE — LETTER
5/14/2020          To Whom It May Concern:    Constance Giles is currently under my medical care. She is currently being evaluated for a medical condition that would make it difficult to work at this time. She will get re-evaluated on 5/28/2020.  Elvis

## (undated) NOTE — LETTER
Date & Time: 9/24/2024, 10:07 AM  Patient: Lucy Dexter  Encounter Provider(s):    Dejan Brink APRN       To Whom It May Concern:    Lucy Dexter was seen and treated in our department on 9/24/2024.  Please excuse her from work today.  If you have any questions or concerns, please do not hesitate to call.        _____________________________  Physician/APC Signature

## (undated) NOTE — LETTER
Date & Time: 5/20/2022, 9:26 AM  Patient: Mandi Section  Encounter Provider(s):    DIANNE Augustine       To Whom It May Concern:    Mandi Washington was seen and treated in our department on 5/20/2022. She may return to work later today, avoiding heavy lifting.     If you have any questions or concerns, please do not hesitate to call.        _____________________________  Physician/APC Signature

## (undated) NOTE — LETTER
7/1/2017          To Whom It May Concern:    Padmini Gottron is currently under my medical care and may not return to  at this time. Please excuse Radha Pabon for the following day June 28, 29 and 30th, 2017 . May return week of July 3rd, 2017.   Activity

## (undated) NOTE — LETTER
3/27/2021          To Whom It May Concern:    Ervin Velasquez is currently under my medical care and may not return to work at this time. Please excuse Betti Homans for 1 days, today Saturday March 27th, 2021.   She may return to work on Monday March 29th,

## (undated) NOTE — LETTER
Date: 10/2/2024    Patient Name: Lucy Dexter          To Whom it may concern:    The above patient was seen at [facility] for treatment of a medical condition.    The patient may return to work with the restriction of no prolonged walking.       Sincerely,        Sincer OCTAVIO Blair, PA-C Orthopedic Surgery / Sports Medicine Specialist  [facility]  [address] [place] [address].org  [email or web address]  t: [phone]  o: [phone]  f: [phone]    This note was dictated using Dragon software.  While it was briefly proofread prior to completion, some grammatical, spelling, and word choice errors due to dictation may still occur.   Sincer MITCHELL Lloyd

## (undated) NOTE — LETTER
3/18/2025          To Whom It May Concern:    Lucy Dexter is currently under my medical care . .    Please excuse Lucy for 2 days. ( 3/13/25  and 3/15/25 )  She may return to work on 3/18/25.  Activity is restricted as follows: none.    If you require additional information please contact our office.          Sincerely,        Citlalli Samson,DIANNE   [phone]           Document generated by:  Elke KAUR RN

## (undated) NOTE — LETTER
Date: 8/18/2023    Patient Name: Jaiden Mott          To Whom it may concern: This letter has been written at the patient's request. The above patient was seen at the [facility] for treatment of a medical condition.     This patient should be excused from attending work 8/18/23        Sincerely,    Maite Hector PA-C

## (undated) NOTE — LETTER
Date: 2/29/2024    Patient Name: Lucy Dexter          To Whom it may concern:    This letter has been written at the patient's request. The above patient was seen at [facility] for treatment of a medical condition.    This patient should be excused from attending work from 02/29/2024 through 03/03/2024.    The patient may return to work on 03/04/2024.        Sincerely,          DIANNE Jean

## (undated) NOTE — LETTER
11/26/2021              [address] [place] [id number]         To Whom It May Concern,    Lulu Kat was seen and evaluated on 11/26/2021.   Due to her current symptoms, please excuse her from work 1

## (undated) NOTE — LETTER
5/28/2020              [address]       To Whom It May Concern,     She is currently under my medical care and has an ongoing medical condition.   Please allow her to be excused from work until Kar

## (undated) NOTE — LETTER
07/08/20        [address]      Dear Colin Carney,    Our records indicate that you have outstanding lab work and or testing that was ordered for you and has not yet been completed:  Orders Placed This Encounter      Xr

## (undated) NOTE — Clinical Note
I am not sure what kind of arthritis she had - it responded to steroids. She wants adjustment for work - which is fine but I am not sure why it's happening. So I told her to try PT first and work accomadations til she is done with PT.  If it turns out that

## (undated) NOTE — LETTER
Date: 12/4/2024        Patient Name: Lucy Dexter      To Whom it may concern:    The above patient was seen at [facility] for treatment of a medical condition.    This patient should avoid prolonged sitting, standing, or walking. Please allow her the option to sit or stand as needed and as she is able to tolerate, during her scheduled work hours.      Sincerely,        Sincer OCTAVIO Blair, PA-C Orthopedic Surgery / Sports Medicine Specialist  [facility]  [address] [place] [address].[email or web address]  [email or web address]  t: [phone]  o: [phone]  f: [phone]    This note was dictated using Dragon software.  While it was briefly proofread prior to completion, some grammatical, spelling, and word choice errors due to dictation may still occur.   MITCHELL Chiu

## (undated) NOTE — LETTER
WHERE IS YOUR PAIN NOW?  Debby the areas on your body where you feel the described sensations.  Use the appropriate symbol.  Debby the areas of radiation.  Include all affected areas.  Just to complete the picture, please draw in the face.     ACHE:  ^ ^ ^   NUMBNESS:  0000   PINS & NEEDLES:  = = = =                              ^ ^ ^                       0000              = = = =                                    ^ ^ ^                       0000            = = = =      BURNING:  XXXX   STABBING: ////                  XXXX                ////                         XXXX          ////     Please debby the line below indicating your degree of pain right now  with 0 being no pain 10 being the worst pain possible.                                         0             1             2              3             4              5              6              7             8             9             10         Patient Signature: